# Patient Record
Sex: FEMALE | Race: WHITE | NOT HISPANIC OR LATINO
[De-identification: names, ages, dates, MRNs, and addresses within clinical notes are randomized per-mention and may not be internally consistent; named-entity substitution may affect disease eponyms.]

---

## 2017-03-02 ENCOUNTER — APPOINTMENT (OUTPATIENT)
Dept: RADIOLOGY | Facility: IMAGING CENTER | Age: 72
End: 2017-03-02

## 2017-03-02 ENCOUNTER — OUTPATIENT (OUTPATIENT)
Dept: OUTPATIENT SERVICES | Facility: HOSPITAL | Age: 72
LOS: 1 days | End: 2017-03-02
Payer: MEDICARE

## 2017-03-02 DIAGNOSIS — Z00.8 ENCOUNTER FOR OTHER GENERAL EXAMINATION: ICD-10-CM

## 2017-03-02 PROCEDURE — 77080 DXA BONE DENSITY AXIAL: CPT

## 2017-03-06 ENCOUNTER — APPOINTMENT (OUTPATIENT)
Dept: NUCLEAR MEDICINE | Facility: HOSPITAL | Age: 72
End: 2017-03-06

## 2017-03-06 ENCOUNTER — OUTPATIENT (OUTPATIENT)
Dept: OUTPATIENT SERVICES | Facility: HOSPITAL | Age: 72
LOS: 1 days | End: 2017-03-06
Payer: MEDICARE

## 2017-03-06 DIAGNOSIS — E05.90 THYROTOXICOSIS, UNSPECIFIED WITHOUT THYROTOXIC CRISIS OR STORM: ICD-10-CM

## 2017-03-06 DIAGNOSIS — C73 MALIGNANT NEOPLASM OF THYROID GLAND: ICD-10-CM

## 2017-03-07 ENCOUNTER — APPOINTMENT (OUTPATIENT)
Dept: NUCLEAR MEDICINE | Facility: HOSPITAL | Age: 72
End: 2017-03-07

## 2017-03-07 PROCEDURE — 78014 THYROID IMAGING W/BLOOD FLOW: CPT

## 2017-03-07 PROCEDURE — A9516: CPT

## 2017-05-22 ENCOUNTER — APPOINTMENT (OUTPATIENT)
Dept: INFECTIOUS DISEASE | Facility: CLINIC | Age: 72
End: 2017-05-22

## 2017-05-22 DIAGNOSIS — Z71.89 OTHER SPECIFIED COUNSELING: ICD-10-CM

## 2017-05-22 RX ORDER — ATOVAQUONE AND PROGUANIL HYDROCHLORIDE 250; 100 MG/1; MG/1
250-100 TABLET, FILM COATED ORAL DAILY
Qty: 24 | Refills: 0 | Status: ACTIVE | COMMUNITY
Start: 2017-05-22 | End: 1900-01-01

## 2017-06-14 ENCOUNTER — APPOINTMENT (OUTPATIENT)
Dept: ULTRASOUND IMAGING | Facility: CLINIC | Age: 72
End: 2017-06-14

## 2017-06-14 ENCOUNTER — OUTPATIENT (OUTPATIENT)
Dept: OUTPATIENT SERVICES | Facility: HOSPITAL | Age: 72
LOS: 1 days | End: 2017-06-14
Payer: MEDICARE

## 2017-06-14 DIAGNOSIS — Z00.8 ENCOUNTER FOR OTHER GENERAL EXAMINATION: ICD-10-CM

## 2017-06-14 PROCEDURE — 76830 TRANSVAGINAL US NON-OB: CPT

## 2017-06-14 PROCEDURE — 76856 US EXAM PELVIC COMPLETE: CPT

## 2017-09-22 ENCOUNTER — APPOINTMENT (OUTPATIENT)
Dept: ULTRASOUND IMAGING | Facility: CLINIC | Age: 72
End: 2017-09-22
Payer: MEDICARE

## 2017-09-22 ENCOUNTER — OUTPATIENT (OUTPATIENT)
Dept: OUTPATIENT SERVICES | Facility: HOSPITAL | Age: 72
LOS: 1 days | End: 2017-09-22
Payer: MEDICARE

## 2017-09-22 DIAGNOSIS — Z00.8 ENCOUNTER FOR OTHER GENERAL EXAMINATION: ICD-10-CM

## 2017-09-22 PROCEDURE — 76856 US EXAM PELVIC COMPLETE: CPT | Mod: 26

## 2017-09-22 PROCEDURE — 76770 US EXAM ABDO BACK WALL COMP: CPT

## 2017-09-22 PROCEDURE — 76770 US EXAM ABDO BACK WALL COMP: CPT | Mod: 26

## 2017-09-22 PROCEDURE — 76856 US EXAM PELVIC COMPLETE: CPT

## 2017-09-26 DIAGNOSIS — D25.2 SUBSEROSAL LEIOMYOMA OF UTERUS: ICD-10-CM

## 2017-09-26 DIAGNOSIS — R30.0 DYSURIA: ICD-10-CM

## 2017-09-26 DIAGNOSIS — N20.0 CALCULUS OF KIDNEY: ICD-10-CM

## 2017-10-16 ENCOUNTER — APPOINTMENT (OUTPATIENT)
Dept: SURGICAL ONCOLOGY | Facility: CLINIC | Age: 72
End: 2017-10-16

## 2017-10-16 ENCOUNTER — RESULT REVIEW (OUTPATIENT)
Age: 72
End: 2017-10-16

## 2017-12-21 ENCOUNTER — OUTPATIENT (OUTPATIENT)
Dept: OUTPATIENT SERVICES | Facility: HOSPITAL | Age: 72
LOS: 1 days | End: 2017-12-21
Payer: MEDICARE

## 2017-12-21 ENCOUNTER — APPOINTMENT (OUTPATIENT)
Dept: ULTRASOUND IMAGING | Facility: IMAGING CENTER | Age: 72
End: 2017-12-21
Payer: MEDICARE

## 2017-12-21 ENCOUNTER — APPOINTMENT (OUTPATIENT)
Dept: MAMMOGRAPHY | Facility: IMAGING CENTER | Age: 72
End: 2017-12-21
Payer: MEDICARE

## 2017-12-21 DIAGNOSIS — Z12.31 ENCOUNTER FOR SCREENING MAMMOGRAM FOR MALIGNANT NEOPLASM OF BREAST: ICD-10-CM

## 2017-12-21 PROCEDURE — 76641 ULTRASOUND BREAST COMPLETE: CPT

## 2017-12-21 PROCEDURE — G0202: CPT | Mod: 26

## 2017-12-21 PROCEDURE — 76641 ULTRASOUND BREAST COMPLETE: CPT | Mod: 26,50

## 2017-12-21 PROCEDURE — 76641 ULTRASOUND BREAST COMPLETE: CPT | Mod: 26,LT

## 2017-12-21 PROCEDURE — 77063 BREAST TOMOSYNTHESIS BI: CPT | Mod: 26

## 2017-12-21 PROCEDURE — 77067 SCR MAMMO BI INCL CAD: CPT

## 2017-12-21 PROCEDURE — 77063 BREAST TOMOSYNTHESIS BI: CPT

## 2018-04-10 ENCOUNTER — APPOINTMENT (OUTPATIENT)
Dept: UROGYNECOLOGY | Facility: CLINIC | Age: 73
End: 2018-04-10
Payer: MEDICARE

## 2018-04-10 VITALS
HEIGHT: 60 IN | SYSTOLIC BLOOD PRESSURE: 171 MMHG | WEIGHT: 107 LBS | BODY MASS INDEX: 21.01 KG/M2 | DIASTOLIC BLOOD PRESSURE: 82 MMHG | HEART RATE: 66 BPM

## 2018-04-10 LAB
BILIRUB UR QL STRIP: NORMAL
CLARITY UR: CLEAR
COLLECTION METHOD: NORMAL
GLUCOSE UR-MCNC: NORMAL
HCG UR QL: 0.2 EU/DL
HGB UR QL STRIP.AUTO: NORMAL
KETONES UR-MCNC: NORMAL
LEUKOCYTE ESTERASE UR QL STRIP: NORMAL
NITRITE UR QL STRIP: NORMAL
PH UR STRIP: 5.5
PROT UR STRIP-MCNC: NORMAL
SP GR UR STRIP: 1.01

## 2018-04-10 PROCEDURE — 81003 URINALYSIS AUTO W/O SCOPE: CPT | Mod: QW

## 2018-04-10 PROCEDURE — 51701 INSERT BLADDER CATHETER: CPT

## 2018-04-10 PROCEDURE — 99204 OFFICE O/P NEW MOD 45 MIN: CPT | Mod: 25

## 2018-04-10 RX ORDER — ESTRADIOL 0.1 MG/G
0.1 CREAM VAGINAL
Qty: 1 | Refills: 3 | Status: ACTIVE | COMMUNITY
Start: 2018-04-10 | End: 1900-01-01

## 2018-04-12 ENCOUNTER — APPOINTMENT (OUTPATIENT)
Dept: UROGYNECOLOGY | Facility: CLINIC | Age: 73
End: 2018-04-12
Payer: MEDICARE

## 2018-04-12 PROCEDURE — A4562: CPT

## 2018-04-12 PROCEDURE — 99214 OFFICE O/P EST MOD 30 MIN: CPT

## 2018-04-23 ENCOUNTER — APPOINTMENT (OUTPATIENT)
Dept: UROGYNECOLOGY | Facility: CLINIC | Age: 73
End: 2018-04-23
Payer: MEDICARE

## 2018-04-23 PROCEDURE — 99213 OFFICE O/P EST LOW 20 MIN: CPT

## 2018-09-10 ENCOUNTER — APPOINTMENT (OUTPATIENT)
Dept: SURGICAL ONCOLOGY | Facility: CLINIC | Age: 73
End: 2018-09-10

## 2018-10-02 ENCOUNTER — APPOINTMENT (OUTPATIENT)
Dept: UROGYNECOLOGY | Facility: CLINIC | Age: 73
End: 2018-10-02
Payer: MEDICARE

## 2018-10-02 PROCEDURE — 99213 OFFICE O/P EST LOW 20 MIN: CPT

## 2019-01-03 ENCOUNTER — APPOINTMENT (OUTPATIENT)
Dept: MAMMOGRAPHY | Facility: IMAGING CENTER | Age: 74
End: 2019-01-03
Payer: MEDICARE

## 2019-01-03 ENCOUNTER — OUTPATIENT (OUTPATIENT)
Dept: OUTPATIENT SERVICES | Facility: HOSPITAL | Age: 74
LOS: 1 days | End: 2019-01-03
Payer: MEDICARE

## 2019-01-03 ENCOUNTER — APPOINTMENT (OUTPATIENT)
Dept: ULTRASOUND IMAGING | Facility: IMAGING CENTER | Age: 74
End: 2019-01-03
Payer: MEDICARE

## 2019-01-03 DIAGNOSIS — Z12.31 ENCOUNTER FOR SCREENING MAMMOGRAM FOR MALIGNANT NEOPLASM OF BREAST: ICD-10-CM

## 2019-01-03 DIAGNOSIS — R92.2 INCONCLUSIVE MAMMOGRAM: ICD-10-CM

## 2019-01-03 PROCEDURE — 77063 BREAST TOMOSYNTHESIS BI: CPT

## 2019-01-03 PROCEDURE — 76641 ULTRASOUND BREAST COMPLETE: CPT | Mod: 26,50

## 2019-01-03 PROCEDURE — 77063 BREAST TOMOSYNTHESIS BI: CPT | Mod: 26

## 2019-01-03 PROCEDURE — 77067 SCR MAMMO BI INCL CAD: CPT | Mod: 26

## 2019-01-03 PROCEDURE — 76641 ULTRASOUND BREAST COMPLETE: CPT

## 2019-01-03 PROCEDURE — 77067 SCR MAMMO BI INCL CAD: CPT

## 2019-03-04 ENCOUNTER — APPOINTMENT (OUTPATIENT)
Dept: RADIOLOGY | Facility: IMAGING CENTER | Age: 74
End: 2019-03-04

## 2019-07-24 ENCOUNTER — OUTPATIENT (OUTPATIENT)
Dept: OUTPATIENT SERVICES | Facility: HOSPITAL | Age: 74
LOS: 1 days | End: 2019-07-24
Payer: MEDICARE

## 2019-07-24 ENCOUNTER — APPOINTMENT (OUTPATIENT)
Dept: RADIOLOGY | Facility: IMAGING CENTER | Age: 74
End: 2019-07-24
Payer: MEDICARE

## 2019-07-24 DIAGNOSIS — Z00.00 ENCOUNTER FOR GENERAL ADULT MEDICAL EXAMINATION WITHOUT ABNORMAL FINDINGS: ICD-10-CM

## 2019-07-24 PROCEDURE — 77080 DXA BONE DENSITY AXIAL: CPT

## 2019-07-24 PROCEDURE — 77080 DXA BONE DENSITY AXIAL: CPT | Mod: 26

## 2019-09-06 ENCOUNTER — OUTPATIENT (OUTPATIENT)
Dept: OUTPATIENT SERVICES | Facility: HOSPITAL | Age: 74
LOS: 1 days | End: 2019-09-06
Payer: MEDICARE

## 2019-09-06 ENCOUNTER — APPOINTMENT (OUTPATIENT)
Dept: MRI IMAGING | Facility: CLINIC | Age: 74
End: 2019-09-06
Payer: MEDICARE

## 2019-09-06 DIAGNOSIS — Z12.31 ENCOUNTER FOR SCREENING MAMMOGRAM FOR MALIGNANT NEOPLASM OF BREAST: ICD-10-CM

## 2019-09-06 PROCEDURE — 70553 MRI BRAIN STEM W/O & W/DYE: CPT | Mod: 26

## 2019-09-06 PROCEDURE — A9585: CPT

## 2019-09-06 PROCEDURE — 70553 MRI BRAIN STEM W/O & W/DYE: CPT

## 2019-10-31 ENCOUNTER — APPOINTMENT (OUTPATIENT)
Dept: ULTRASOUND IMAGING | Facility: CLINIC | Age: 74
End: 2019-10-31
Payer: MEDICARE

## 2019-10-31 ENCOUNTER — OUTPATIENT (OUTPATIENT)
Dept: OUTPATIENT SERVICES | Facility: HOSPITAL | Age: 74
LOS: 1 days | End: 2019-10-31
Payer: MEDICARE

## 2019-10-31 DIAGNOSIS — R35.0 FREQUENCY OF MICTURITION: ICD-10-CM

## 2019-10-31 PROCEDURE — 76830 TRANSVAGINAL US NON-OB: CPT

## 2019-10-31 PROCEDURE — 76856 US EXAM PELVIC COMPLETE: CPT | Mod: 26

## 2019-10-31 PROCEDURE — 76830 TRANSVAGINAL US NON-OB: CPT | Mod: 26

## 2019-10-31 PROCEDURE — 76856 US EXAM PELVIC COMPLETE: CPT

## 2019-11-18 ENCOUNTER — OUTPATIENT (OUTPATIENT)
Dept: OUTPATIENT SERVICES | Facility: HOSPITAL | Age: 74
LOS: 1 days | End: 2019-11-18
Payer: MEDICARE

## 2019-11-18 ENCOUNTER — APPOINTMENT (OUTPATIENT)
Dept: ULTRASOUND IMAGING | Facility: CLINIC | Age: 74
End: 2019-11-18

## 2019-11-18 DIAGNOSIS — Z00.8 ENCOUNTER FOR OTHER GENERAL EXAMINATION: ICD-10-CM

## 2019-11-18 PROCEDURE — 76831 ECHO EXAM UTERUS: CPT

## 2019-11-18 PROCEDURE — 58340 CATHETER FOR HYSTEROGRAPHY: CPT

## 2019-11-18 PROCEDURE — 76831 ECHO EXAM UTERUS: CPT | Mod: 26

## 2020-01-03 ENCOUNTER — RESULT REVIEW (OUTPATIENT)
Age: 75
End: 2020-01-03

## 2020-01-07 ENCOUNTER — APPOINTMENT (OUTPATIENT)
Dept: ULTRASOUND IMAGING | Facility: CLINIC | Age: 75
End: 2020-01-07
Payer: MEDICARE

## 2020-01-07 ENCOUNTER — APPOINTMENT (OUTPATIENT)
Dept: MAMMOGRAPHY | Facility: CLINIC | Age: 75
End: 2020-01-07
Payer: MEDICARE

## 2020-01-07 ENCOUNTER — OUTPATIENT (OUTPATIENT)
Dept: OUTPATIENT SERVICES | Facility: HOSPITAL | Age: 75
LOS: 1 days | End: 2020-01-07
Payer: MEDICARE

## 2020-01-07 DIAGNOSIS — Z00.8 ENCOUNTER FOR OTHER GENERAL EXAMINATION: ICD-10-CM

## 2020-01-07 PROCEDURE — 76641 ULTRASOUND BREAST COMPLETE: CPT | Mod: 26,50

## 2020-01-07 PROCEDURE — 77063 BREAST TOMOSYNTHESIS BI: CPT

## 2020-01-07 PROCEDURE — 77063 BREAST TOMOSYNTHESIS BI: CPT | Mod: 26

## 2020-01-07 PROCEDURE — 77067 SCR MAMMO BI INCL CAD: CPT

## 2020-01-07 PROCEDURE — 77067 SCR MAMMO BI INCL CAD: CPT | Mod: 26

## 2020-01-07 PROCEDURE — 76641 ULTRASOUND BREAST COMPLETE: CPT

## 2020-10-20 ENCOUNTER — APPOINTMENT (OUTPATIENT)
Dept: UROGYNECOLOGY | Facility: CLINIC | Age: 75
End: 2020-10-20
Payer: MEDICARE

## 2020-10-20 VITALS
TEMPERATURE: 97.5 F | BODY MASS INDEX: 57.52 KG/M2 | SYSTOLIC BLOOD PRESSURE: 142 MMHG | WEIGHT: 293 LBS | DIASTOLIC BLOOD PRESSURE: 86 MMHG | HEIGHT: 60 IN

## 2020-10-20 DIAGNOSIS — N81.10 CYSTOCELE, UNSPECIFIED: ICD-10-CM

## 2020-10-20 PROCEDURE — 99214 OFFICE O/P EST MOD 30 MIN: CPT

## 2020-10-20 PROCEDURE — A4562: CPT

## 2020-10-20 NOTE — PHYSICAL EXAM
[Chaperone Present] : A chaperone was present in the examining room during all aspects of the physical examination [Labia Majora] : were normal [Vulvar Atrophy] : vulvar atrophy [Atrophy] : atrophy [Normal Appearance] : general appearance was normal [Exam Deferred] : was deferred [No Bleeding] : there was no active vaginal bleeding [Aa ____] : Aa [unfilled] [Ba ____] : Ba [unfilled] [C ____] : C [unfilled] [GH ____] : GH [unfilled] [PB ____] : PB [unfilled] [TVL ____] : TVL  [unfilled] [Ap ____] : Ap [unfilled] [Bp ____] : Bp [unfilled] [D ____] : D [unfilled] [Normal] : normal [FreeTextEntry1] : General: Well, appearing. Alert and orientated. No acute distress\par HEENT: Normocephalic, atraumatic and extraocular muscles appear to be intact \par Neck: Full range of motion, no obvious lymphadenopathy, deformities, or masses noted \par Respiratory: Speaking in full sentences comfortably, normal work of breathing and no cough during visit\par Musculoskeletal: active full range of motion in extremities \par Extremities: No upper extremity edema noted\par Skin: no obvious rash or skin lesions\par Neuro: Orientated X 3, speech is fluent, normal rate\par Psych: Normal mood and affect \par   [Tenderness] : ~T no ~M abdominal tenderness observed [Distended] : not distended

## 2020-10-20 NOTE — DISCUSSION/SUMMARY
[FreeTextEntry1] : \par 1. Uterovaginal prolapse, midline cystocele:\par -Refitted with RS#3\par - Pessary precautions and instructions reviewed\par \par 2. Atrophy:\par -Increase Estrace to 1 g biw. Instructions for use reviewed. She denies side effects with use\par \par RTO in 3 mo for follow up, or sooner if issues arise. \par \par The following was provided to her in written form and reviewed extensively. Patient was given a copy to take home: \par Start low dose vaginal estrogen cream. It comes with an applicator that is inserted into the vagina at bedtime. Fill up the applicator with cream up to the 1 gram line. While lying in bed, gently insert the pre-filled applicator with the cream into the vagina ~2 inches inside the vagina. Hit the button to release the cream into the vagina. Let the cream stay inside the vagina overnight where it will absorb. \par \par Directions: Use twice weekly at bedtime (ex. Mon/Thursday)\par \par When inserted pessary, use Astroglide or any water-based vaginal lubricant. Recommend removing the pessary once weekly at bedtime, reinsert in the morning\par

## 2020-10-20 NOTE — PROCEDURE
[Good Fit] : fits well [Pessary Inserted] : inserted [None] : no bleeding [Refit] : refit is not needed [Erosion] : no evidence of erosion [Erythema] : no erythema [Discharge] : no vaginal discharge [Infection] : no evidence of infection [FreeTextEntry1] : ring with support #3

## 2020-10-20 NOTE — HISTORY OF PRESENT ILLNESS
[Rectal Prolapse] : no [Unable To Restrain Bowel Movement] : no [Urinary Tract Infection] : no [Urinary Frequency] : no [Constipation Obstructed Defecation] : no [Rectal Pain] : mild [] : years ago [FreeTextEntry5] : underwent treatment with ring pessary #2, removed January 2020. Lost pessary [de-identified] : irritation from dryness. Uses estrace however only uses a pea-sized amount twice weekly [FreeTextEntry1] : \par Janae presents for follow up of her pelvic organ prolapse. She is transferring her care from Dr Vaca. She has undergone treatment in past with a ring pessary, inserted 2018. She was performing self-care until January 2020 when she removed it and then subsequently lost the pessary. She reports bothersome bulge symptoms today and desires evaluation and insertion of new pessary.  She reports ring #2 felt as though it wasn’t holding up her prolapse as adequately as it did in 2018.

## 2021-01-12 ENCOUNTER — APPOINTMENT (OUTPATIENT)
Dept: UROGYNECOLOGY | Facility: CLINIC | Age: 76
End: 2021-01-12

## 2021-03-15 ENCOUNTER — APPOINTMENT (OUTPATIENT)
Dept: ULTRASOUND IMAGING | Facility: CLINIC | Age: 76
End: 2021-03-15
Payer: MEDICARE

## 2021-03-15 ENCOUNTER — OUTPATIENT (OUTPATIENT)
Dept: OUTPATIENT SERVICES | Facility: HOSPITAL | Age: 76
LOS: 1 days | End: 2021-03-15
Payer: MEDICARE

## 2021-03-15 DIAGNOSIS — Z00.8 ENCOUNTER FOR OTHER GENERAL EXAMINATION: ICD-10-CM

## 2021-03-15 DIAGNOSIS — N81.89 OTHER FEMALE GENITAL PROLAPSE: ICD-10-CM

## 2021-03-15 PROCEDURE — 76856 US EXAM PELVIC COMPLETE: CPT | Mod: 26

## 2021-03-15 PROCEDURE — 76856 US EXAM PELVIC COMPLETE: CPT

## 2021-03-18 DIAGNOSIS — Z01.818 ENCOUNTER FOR OTHER PREPROCEDURAL EXAMINATION: ICD-10-CM

## 2021-03-20 ENCOUNTER — APPOINTMENT (OUTPATIENT)
Dept: DISASTER EMERGENCY | Facility: CLINIC | Age: 76
End: 2021-03-20

## 2021-03-21 LAB — SARS-COV-2 N GENE NPH QL NAA+PROBE: NOT DETECTED

## 2021-03-23 ENCOUNTER — RESULT REVIEW (OUTPATIENT)
Age: 76
End: 2021-03-23

## 2021-03-23 ENCOUNTER — OUTPATIENT (OUTPATIENT)
Dept: OUTPATIENT SERVICES | Facility: HOSPITAL | Age: 76
LOS: 1 days | End: 2021-03-23
Payer: MEDICARE

## 2021-03-23 VITALS
TEMPERATURE: 99 F | DIASTOLIC BLOOD PRESSURE: 66 MMHG | RESPIRATION RATE: 17 BRPM | SYSTOLIC BLOOD PRESSURE: 131 MMHG | HEIGHT: 65 IN | WEIGHT: 106.04 LBS | HEART RATE: 63 BPM

## 2021-03-23 VITALS
HEART RATE: 58 BPM | DIASTOLIC BLOOD PRESSURE: 59 MMHG | SYSTOLIC BLOOD PRESSURE: 122 MMHG | RESPIRATION RATE: 16 BRPM | OXYGEN SATURATION: 98 %

## 2021-03-23 DIAGNOSIS — S62.102D FRACTURE OF UNSPECIFIED CARPAL BONE, LEFT WRIST, SUBSEQUENT ENCOUNTER FOR FRACTURE WITH ROUTINE HEALING: Chronic | ICD-10-CM

## 2021-03-23 DIAGNOSIS — Z90.89 ACQUIRED ABSENCE OF OTHER ORGANS: Chronic | ICD-10-CM

## 2021-03-23 DIAGNOSIS — M23.8X1 OTHER INTERNAL DERANGEMENTS OF RIGHT KNEE: Chronic | ICD-10-CM

## 2021-03-23 DIAGNOSIS — Z90.49 ACQUIRED ABSENCE OF OTHER SPECIFIED PARTS OF DIGESTIVE TRACT: Chronic | ICD-10-CM

## 2021-03-23 DIAGNOSIS — K21.9 GASTRO-ESOPHAGEAL REFLUX DISEASE WITHOUT ESOPHAGITIS: ICD-10-CM

## 2021-03-23 PROCEDURE — 88305 TISSUE EXAM BY PATHOLOGIST: CPT

## 2021-03-23 PROCEDURE — 88305 TISSUE EXAM BY PATHOLOGIST: CPT | Mod: 26

## 2021-03-23 PROCEDURE — 43239 EGD BIOPSY SINGLE/MULTIPLE: CPT

## 2021-03-23 RX ORDER — SODIUM CHLORIDE 9 MG/ML
500 INJECTION INTRAMUSCULAR; INTRAVENOUS; SUBCUTANEOUS
Refills: 0 | Status: DISCONTINUED | OUTPATIENT
Start: 2021-03-23 | End: 2021-04-06

## 2021-03-23 NOTE — ASU DISCHARGE PLAN (ADULT/PEDIATRIC) - CARE PROVIDER_API CALL
Karl Alcantara  GASTROENTEROLOGY  [address], [address]  [place], [place] [id number]  Phone: [phone]  Fax: [phone]  Established Patient  Follow Up Time:

## 2021-03-23 NOTE — PRE PROCEDURE NOTE - ATTENDING COMMENTS
Karl Alcantara MD, FACP, FACG, AGAF  [facility]  [phone]     After hours and weekend coverage GI service : [phone]

## 2021-03-23 NOTE — ASU DISCHARGE PLAN (ADULT/PEDIATRIC) - CALL YOUR DOCTOR IF YOU HAVE ANY OF THE FOLLOWING:
Bleeding that does not stop/Pain not relieved by Medications/Fever greater than (need to indicate Fahrenheit or Celsius)/Numbness, tingling, color or temperature change to extremity/Nausea and vomiting that does not stop/Unable to urinate/Excessive diarrhea/Inability to tolerate liquids or foods/Increased irritability or sluggishness

## 2021-03-23 NOTE — PRE PROCEDURE NOTE - PRE PROCEDURE EVALUATION
Pre-Endoscopy Evaluation      Referring Physician:  Karl Alcantara M.D.                          Procedure:  EGD    Indication for Procedure:  GERD    Pertinent History:      Sedation by Anesthesia [x]    PAST MEDICAL & SURGICAL HISTORY:  History of tonsillectomy    History of appendectomy    Derangement of right knee ligament  ACL tear    Closed fracture of left wrist with routine healing, subsequent encounter        PMH of Gastroparesis [ ]  Gastric Surgery [ ]  Gastric Outlet Obstruction [ ]  None [x]    Allergies    sulfa drugs (Rash)    Intolerances        Latex allergy: [ ] yes [ ] no    Medications:MEDICATIONS  (STANDING):  sodium chloride 0.9%. 500 milliLiter(s) (30 mL/Hr) IV Continuous <Continuous>    Smoking: [ ] yes  [x] no    AICD/PPM: [ ] yes   [x] no    Pertinent lab data:                        Physical Examination:  Daily Height in cm: 165.1 (23 Mar 2021 08:48)    Daily   Vital Signs Last 24 Hrs  T(C): 37.2 (23 Mar 2021 08:48), Max: 37.2 (23 Mar 2021 08:48)  T(F): 99 (23 Mar 2021 08:48), Max: 99 (23 Mar 2021 08:48)  HR: 63 (23 Mar 2021 08:48) (63 - 63)  BP: 131/66 (23 Mar 2021 08:48) (131/66 - 131/66)  BP(mean): --  RR: 17 (23 Mar 2021 08:48) (17 - 17)  SpO2: --      Constitutional: NAD    HEENT: PERRLA, EOMI,       Neck:  No JVD    Respiratory: CTAB/L    Cardiovascular: S1 and S2    Gastrointestinal: BS+, soft, NT/ND    Extremities: No peripheral edema    Neurological: A/O x 3, no focal deficits    Psychiatric: Normal mood, normal affect    : No Johnson    Skin: No rashes    Comments:    ASA Class: I [ ]  II [x]  III [ ]  IV [ ]

## 2021-03-24 LAB — SURGICAL PATHOLOGY STUDY: SIGNIFICANT CHANGE UP

## 2021-03-31 ENCOUNTER — APPOINTMENT (OUTPATIENT)
Dept: MAMMOGRAPHY | Facility: CLINIC | Age: 76
End: 2021-03-31
Payer: MEDICARE

## 2021-03-31 ENCOUNTER — APPOINTMENT (OUTPATIENT)
Dept: ULTRASOUND IMAGING | Facility: CLINIC | Age: 76
End: 2021-03-31
Payer: MEDICARE

## 2021-03-31 ENCOUNTER — OUTPATIENT (OUTPATIENT)
Dept: OUTPATIENT SERVICES | Facility: HOSPITAL | Age: 76
LOS: 1 days | End: 2021-03-31
Payer: MEDICARE

## 2021-03-31 DIAGNOSIS — S62.102D FRACTURE OF UNSPECIFIED CARPAL BONE, LEFT WRIST, SUBSEQUENT ENCOUNTER FOR FRACTURE WITH ROUTINE HEALING: Chronic | ICD-10-CM

## 2021-03-31 DIAGNOSIS — Z90.89 ACQUIRED ABSENCE OF OTHER ORGANS: Chronic | ICD-10-CM

## 2021-03-31 DIAGNOSIS — M23.8X1 OTHER INTERNAL DERANGEMENTS OF RIGHT KNEE: Chronic | ICD-10-CM

## 2021-03-31 DIAGNOSIS — Z00.8 ENCOUNTER FOR OTHER GENERAL EXAMINATION: ICD-10-CM

## 2021-03-31 DIAGNOSIS — Z90.49 ACQUIRED ABSENCE OF OTHER SPECIFIED PARTS OF DIGESTIVE TRACT: Chronic | ICD-10-CM

## 2021-03-31 PROCEDURE — 76641 ULTRASOUND BREAST COMPLETE: CPT | Mod: 26,50

## 2021-03-31 PROCEDURE — 77063 BREAST TOMOSYNTHESIS BI: CPT | Mod: 26

## 2021-03-31 PROCEDURE — 76641 ULTRASOUND BREAST COMPLETE: CPT

## 2021-03-31 PROCEDURE — 77063 BREAST TOMOSYNTHESIS BI: CPT

## 2021-03-31 PROCEDURE — 77067 SCR MAMMO BI INCL CAD: CPT | Mod: 26

## 2021-03-31 PROCEDURE — 77067 SCR MAMMO BI INCL CAD: CPT

## 2021-04-02 ENCOUNTER — NON-APPOINTMENT (OUTPATIENT)
Age: 76
End: 2021-04-02

## 2021-04-15 ENCOUNTER — APPOINTMENT (OUTPATIENT)
Dept: SURGERY | Facility: CLINIC | Age: 76
End: 2021-04-15
Payer: MEDICARE

## 2021-04-15 ENCOUNTER — NON-APPOINTMENT (OUTPATIENT)
Age: 76
End: 2021-04-15

## 2021-04-15 VITALS
DIASTOLIC BLOOD PRESSURE: 84 MMHG | SYSTOLIC BLOOD PRESSURE: 170 MMHG | BODY MASS INDEX: 20.81 KG/M2 | WEIGHT: 106 LBS | HEART RATE: 65 BPM | HEIGHT: 60 IN

## 2021-04-15 DIAGNOSIS — M81.0 AGE-RELATED OSTEOPOROSIS W/OUT CURRENT PATHOLOGICAL FRACTURE: ICD-10-CM

## 2021-04-15 PROCEDURE — 36415 COLL VENOUS BLD VENIPUNCTURE: CPT

## 2021-04-15 PROCEDURE — 99204 OFFICE O/P NEW MOD 45 MIN: CPT | Mod: 25

## 2021-04-16 LAB
24R-OH-CALCIDIOL SERPL-MCNC: 114 PG/ML
CALCIUM SERPL-MCNC: 9.6 MG/DL
CALCIUM SERPL-MCNC: 9.6 MG/DL
PARATHYROID HORMONE INTACT: 82 PG/ML

## 2021-04-16 RX ORDER — KETOCONAZOLE 20 MG/G
2 CREAM TOPICAL
Qty: 30 | Refills: 0 | Status: ACTIVE | COMMUNITY
Start: 2021-04-06

## 2021-04-16 RX ORDER — HYDROCORTISONE 25 MG/G
2.5 CREAM TOPICAL
Qty: 1 | Refills: 0 | Status: ACTIVE | COMMUNITY
Start: 2021-04-06

## 2021-04-16 RX ORDER — FAMOTIDINE 20 MG/1
20 TABLET, FILM COATED ORAL
Qty: 30 | Refills: 0 | Status: ACTIVE | COMMUNITY
Start: 2021-03-11

## 2021-04-16 RX ORDER — PANTOPRAZOLE 40 MG/1
40 TABLET, DELAYED RELEASE ORAL
Qty: 30 | Refills: 0 | Status: ACTIVE | COMMUNITY
Start: 2021-03-23

## 2021-04-16 NOTE — PHYSICAL EXAM
[de-identified] : 4 cm right thyroid nodule, well circumscribed and mobile [Laryngoscopy Performed] : laryngoscopy was performed, see procedure section for findings [Midline] : located in midline position [Normal] : orientation to person, place, and time: normal [de-identified] : indirect  laryngoscopy shows normal vocal cord mobility bilaterally with no lesions noted

## 2021-04-16 NOTE — ASSESSMENT
[FreeTextEntry1] : lengthy discussion regarding options for management including active surveillance  vs thyroid lobectomy with possible paratracheal node dissection, with or without frozen section vs total thyroidectomy with possible paratracheal node dissection. risks, benefits and alternatives discussed at length.  I have discussed with the patient the anatomy of the area, the pathophysiology of the disease process and the rationale for surgery.  The attendant risks, possible complications and expected postoperative course have been discussed in detail.  I have given the patient the opportunity to ask questions, and all questions have been answered to the patient's satisfaction.  recommended total thyroidectomy, particular if require parathyroidectomy at same time.  bloods drawn.  4D CT requested. surgery to be scheduled at [facility].  to call next week for results. \par

## 2021-04-16 NOTE — CONSULT LETTER
[Dear  ___] : Dear  [unfilled], [Consult Letter:] : I had the pleasure of evaluating your patient, [unfilled]. [Please see my note below.] : Please see my note below. [Consult Closing:] : Thank you very much for allowing me to participate in the care of this patient.  If you have any questions, please do not hesitate to contact me. [Sincerely,] : Sincerely, [FreeTextEntry2] : Dr. Galina Vazquez, Dr. Robb Avelar [FreeTextEntry3] : Quirino Varner MD, FACS\par System Director, Endocrine Surgery\par [facility]\par Assistant Clinical Professor of Surgery\par [employer] [facility]  [DrHerman  ___] : Dr. GALARZA

## 2021-04-16 NOTE — HISTORY OF PRESENT ILLNESS
[de-identified] : Pt c/o Thyroid nodule and  hoarseness.  denies dysphagia, SOB,bone pain, constipation or fatigue, hoarseness or RT exposure. \par TFTs normal\par Ca 9.8,  PTH 69,  Vitamin  D 79\par sonogram Right 4.2 cm,  left subcentimeter nodules\par bone density: osteoporosis\par FNA (CBL) FLUS,  TSHR positive\par I have reviewed all old and new data and available images.  Additional information was obtained from others present at the time of visit to ensure the completeness of the history\par \par

## 2021-04-16 NOTE — REASON FOR VISIT
[Initial Consultation] : an initial consultation for [Spouse] : spouse [FreeTextEntry2] : Thyroid nodule [Other: _____] : [unfilled]

## 2021-04-19 ENCOUNTER — RESULT REVIEW (OUTPATIENT)
Age: 76
End: 2021-04-19

## 2021-04-20 ENCOUNTER — OUTPATIENT (OUTPATIENT)
Dept: OUTPATIENT SERVICES | Facility: HOSPITAL | Age: 76
LOS: 1 days | End: 2021-04-20
Payer: MEDICARE

## 2021-04-20 ENCOUNTER — APPOINTMENT (OUTPATIENT)
Dept: CT IMAGING | Facility: CLINIC | Age: 76
End: 2021-04-20
Payer: MEDICARE

## 2021-04-20 DIAGNOSIS — Z90.89 ACQUIRED ABSENCE OF OTHER ORGANS: Chronic | ICD-10-CM

## 2021-04-20 DIAGNOSIS — M23.8X1 OTHER INTERNAL DERANGEMENTS OF RIGHT KNEE: Chronic | ICD-10-CM

## 2021-04-20 DIAGNOSIS — E21.3 HYPERPARATHYROIDISM, UNSPECIFIED: ICD-10-CM

## 2021-04-20 DIAGNOSIS — Z90.49 ACQUIRED ABSENCE OF OTHER SPECIFIED PARTS OF DIGESTIVE TRACT: Chronic | ICD-10-CM

## 2021-04-20 DIAGNOSIS — S62.102D FRACTURE OF UNSPECIFIED CARPAL BONE, LEFT WRIST, SUBSEQUENT ENCOUNTER FOR FRACTURE WITH ROUTINE HEALING: Chronic | ICD-10-CM

## 2021-04-20 PROCEDURE — 70491 CT SOFT TISSUE NECK W/DYE: CPT | Mod: 26,ME

## 2021-04-20 PROCEDURE — 70492 CT SFT TSUE NCK W/O & W/DYE: CPT

## 2021-04-20 PROCEDURE — G1004: CPT

## 2021-04-21 ENCOUNTER — TRANSCRIPTION ENCOUNTER (OUTPATIENT)
Age: 76
End: 2021-04-21

## 2021-04-22 ENCOUNTER — NON-APPOINTMENT (OUTPATIENT)
Age: 76
End: 2021-04-22

## 2021-04-22 DIAGNOSIS — R91.1 SOLITARY PULMONARY NODULE: ICD-10-CM

## 2021-04-23 ENCOUNTER — OUTPATIENT (OUTPATIENT)
Dept: OUTPATIENT SERVICES | Facility: HOSPITAL | Age: 76
LOS: 1 days | End: 2021-04-23
Payer: MEDICARE

## 2021-04-23 ENCOUNTER — APPOINTMENT (OUTPATIENT)
Dept: CT IMAGING | Facility: CLINIC | Age: 76
End: 2021-04-23
Payer: MEDICARE

## 2021-04-23 DIAGNOSIS — S62.102D FRACTURE OF UNSPECIFIED CARPAL BONE, LEFT WRIST, SUBSEQUENT ENCOUNTER FOR FRACTURE WITH ROUTINE HEALING: Chronic | ICD-10-CM

## 2021-04-23 DIAGNOSIS — Z90.49 ACQUIRED ABSENCE OF OTHER SPECIFIED PARTS OF DIGESTIVE TRACT: Chronic | ICD-10-CM

## 2021-04-23 DIAGNOSIS — M23.8X1 OTHER INTERNAL DERANGEMENTS OF RIGHT KNEE: Chronic | ICD-10-CM

## 2021-04-23 DIAGNOSIS — R91.1 SOLITARY PULMONARY NODULE: ICD-10-CM

## 2021-04-23 DIAGNOSIS — Z90.89 ACQUIRED ABSENCE OF OTHER ORGANS: Chronic | ICD-10-CM

## 2021-04-23 PROCEDURE — 71250 CT THORAX DX C-: CPT | Mod: 26,ME

## 2021-04-23 PROCEDURE — G1004: CPT

## 2021-04-23 PROCEDURE — 71250 CT THORAX DX C-: CPT

## 2021-04-27 ENCOUNTER — NON-APPOINTMENT (OUTPATIENT)
Age: 76
End: 2021-04-27

## 2021-05-11 ENCOUNTER — OUTPATIENT (OUTPATIENT)
Dept: OUTPATIENT SERVICES | Facility: HOSPITAL | Age: 76
LOS: 1 days | End: 2021-05-11
Payer: MEDICARE

## 2021-05-11 VITALS
SYSTOLIC BLOOD PRESSURE: 130 MMHG | RESPIRATION RATE: 16 BRPM | HEIGHT: 59 IN | TEMPERATURE: 97 F | DIASTOLIC BLOOD PRESSURE: 80 MMHG | OXYGEN SATURATION: 98 % | WEIGHT: 104.94 LBS | HEART RATE: 80 BPM

## 2021-05-11 DIAGNOSIS — Z98.890 OTHER SPECIFIED POSTPROCEDURAL STATES: Chronic | ICD-10-CM

## 2021-05-11 DIAGNOSIS — E21.0 PRIMARY HYPERPARATHYROIDISM: ICD-10-CM

## 2021-05-11 DIAGNOSIS — Z90.49 ACQUIRED ABSENCE OF OTHER SPECIFIED PARTS OF DIGESTIVE TRACT: Chronic | ICD-10-CM

## 2021-05-11 DIAGNOSIS — S62.102D FRACTURE OF UNSPECIFIED CARPAL BONE, LEFT WRIST, SUBSEQUENT ENCOUNTER FOR FRACTURE WITH ROUTINE HEALING: Chronic | ICD-10-CM

## 2021-05-11 DIAGNOSIS — Z90.89 ACQUIRED ABSENCE OF OTHER ORGANS: Chronic | ICD-10-CM

## 2021-05-11 DIAGNOSIS — M23.8X1 OTHER INTERNAL DERANGEMENTS OF RIGHT KNEE: Chronic | ICD-10-CM

## 2021-05-11 DIAGNOSIS — E04.1 NONTOXIC SINGLE THYROID NODULE: ICD-10-CM

## 2021-05-11 LAB
ANION GAP SERPL CALC-SCNC: 11 MMOL/L — SIGNIFICANT CHANGE UP (ref 7–14)
BUN SERPL-MCNC: 16 MG/DL — SIGNIFICANT CHANGE UP (ref 7–23)
CALCIUM SERPL-MCNC: 9.6 MG/DL — SIGNIFICANT CHANGE UP (ref 8.4–10.5)
CHLORIDE SERPL-SCNC: 106 MMOL/L — SIGNIFICANT CHANGE UP (ref 98–107)
CO2 SERPL-SCNC: 24 MMOL/L — SIGNIFICANT CHANGE UP (ref 22–31)
CREAT SERPL-MCNC: 0.59 MG/DL — SIGNIFICANT CHANGE UP (ref 0.5–1.3)
GLUCOSE SERPL-MCNC: 75 MG/DL — SIGNIFICANT CHANGE UP (ref 70–99)
HCT VFR BLD CALC: 40.8 % — SIGNIFICANT CHANGE UP (ref 34.5–45)
HGB BLD-MCNC: 13.2 G/DL — SIGNIFICANT CHANGE UP (ref 11.5–15.5)
MCHC RBC-ENTMCNC: 29.3 PG — SIGNIFICANT CHANGE UP (ref 27–34)
MCHC RBC-ENTMCNC: 32.4 GM/DL — SIGNIFICANT CHANGE UP (ref 32–36)
MCV RBC AUTO: 90.5 FL — SIGNIFICANT CHANGE UP (ref 80–100)
NRBC # BLD: 0 /100 WBCS — SIGNIFICANT CHANGE UP
NRBC # FLD: 0 K/UL — SIGNIFICANT CHANGE UP
PLATELET # BLD AUTO: 224 K/UL — SIGNIFICANT CHANGE UP (ref 150–400)
POTASSIUM SERPL-MCNC: 4 MMOL/L — SIGNIFICANT CHANGE UP (ref 3.5–5.3)
POTASSIUM SERPL-SCNC: 4 MMOL/L — SIGNIFICANT CHANGE UP (ref 3.5–5.3)
RBC # BLD: 4.51 M/UL — SIGNIFICANT CHANGE UP (ref 3.8–5.2)
RBC # FLD: 13.1 % — SIGNIFICANT CHANGE UP (ref 10.3–14.5)
SODIUM SERPL-SCNC: 141 MMOL/L — SIGNIFICANT CHANGE UP (ref 135–145)
WBC # BLD: 8.99 K/UL — SIGNIFICANT CHANGE UP (ref 3.8–10.5)
WBC # FLD AUTO: 8.99 K/UL — SIGNIFICANT CHANGE UP (ref 3.8–10.5)

## 2021-05-11 PROCEDURE — 93010 ELECTROCARDIOGRAM REPORT: CPT

## 2021-05-11 NOTE — H&P PST ADULT - ACTIVITY
works out daily (stationary bike, elliptical and lift weights) works out daily (rides stationary bike, elliptical and lift weights)

## 2021-05-11 NOTE — H&P PST ADULT - NSICDXPROBLEM_GEN_ALL_CORE_FT
PROBLEM DIAGNOSES  Problem: Nontoxic single thyroid nodule  Assessment and Plan: preop for total thyroidectomy,possible paratracheal node dissection, parathyroidectomy with parathyroid hormone assay on 5/24/21  preop instructions given, pt verbalized understanding  GI prophylaxis and chlorhexidine wash provided  pt is scheduled for COVID testing preop  medical clearance requested by surgeon and PST- pt >65 yrs old

## 2021-05-11 NOTE — H&P PST ADULT - NSICDXPASTSURGICALHX_GEN_ALL_CORE_FT
PAST SURGICAL HISTORY:  Closed fracture of left wrist with routine healing, subsequent encounter     Derangement of right knee ligament ACL tear    History of appendectomy     History of tonsillectomy     S/P excision of fibroadenoma of breast     S/P thyroid biopsy

## 2021-05-11 NOTE — H&P PST ADULT - NSICDXPASTMEDICALHX_GEN_ALL_CORE_FT
PAST MEDICAL HISTORY:  Fibroadenoma of breast     H/O Graves' disease dx 4 yrs ago. treated with methimazole. off meds for 3 yrs    Osteoporosis     Primary hyperparathyroidism     Thyroid nodule

## 2021-05-11 NOTE — H&P PST ADULT - HISTORY OF PRESENT ILLNESS
74 y/o female with osteoporosis presents to PST preop for total thyroidectomy, possible paratracheal node dissection, parathyroidectomy with parathyroid hormone assay. pt reports h/o thyroid nodule for 30 years. pt says she was monitored over the years by US and thyroid biopsies. Pt says a recent biopsy result in march 2021 came back "nonspecific".  76 y/o female with osteoporosis presents to PST preop for total thyroidectomy, possible paratracheal node dissection, parathyroidectomy with parathyroid hormone assay. pt reports h/o thyroid nodule for 30 years. pt says she was monitored over the years by US and multiple thyroid biopsies. Pt says a recent thyroid biopsy result in march 2021 came back "nonspecific".

## 2021-05-11 NOTE — H&P PST ADULT - NSICDXFAMILYHX_GEN_ALL_CORE_FT
FAMILY HISTORY:  Father  Still living? Unknown  Family history of Graves' disease, Age at diagnosis: Age Unknown    Mother  Still living? Unknown  Family history of goiter, Age at diagnosis: Age Unknown  Family history of renal cancer, Age at diagnosis: Age Unknown  FH: lung cancer, Age at diagnosis: Age Unknown

## 2021-05-21 ENCOUNTER — APPOINTMENT (OUTPATIENT)
Dept: DISASTER EMERGENCY | Facility: CLINIC | Age: 76
End: 2021-05-21

## 2021-05-21 PROBLEM — E21.0 PRIMARY HYPERPARATHYROIDISM: Chronic | Status: ACTIVE | Noted: 2021-05-11

## 2021-05-21 PROBLEM — M81.0 AGE-RELATED OSTEOPOROSIS WITHOUT CURRENT PATHOLOGICAL FRACTURE: Chronic | Status: ACTIVE | Noted: 2021-05-11

## 2021-05-21 PROBLEM — E04.1 NONTOXIC SINGLE THYROID NODULE: Chronic | Status: ACTIVE | Noted: 2021-05-11

## 2021-05-21 PROBLEM — D24.9 BENIGN NEOPLASM OF UNSPECIFIED BREAST: Chronic | Status: ACTIVE | Noted: 2021-05-11

## 2021-05-21 PROBLEM — Z86.39 PERSONAL HISTORY OF OTHER ENDOCRINE, NUTRITIONAL AND METABOLIC DISEASE: Chronic | Status: ACTIVE | Noted: 2021-05-11

## 2021-05-21 NOTE — ASU PATIENT PROFILE, ADULT - PSH
Closed fracture of left wrist with routine healing, subsequent encounter    Derangement of right knee ligament  ACL tear  History of appendectomy    History of tonsillectomy    S/P excision of fibroadenoma of breast    S/P thyroid biopsy

## 2021-05-21 NOTE — ASU PATIENT PROFILE, ADULT - PRO INTERPRETER NEED 2
Dx:    Lumbar radiculopathy (M54.16)  Lumbar disc disease (M51.9)  Lumbar herniated disc (M51.26)  Neck pain on right side (M54.2)      Insurance (Authorized # of Visits):  [employer] PPO; 13 visits; Cert ends 6/6/4935       Authorizing Physician: Dr. Ingrid Camara MD 5/5  Great Toe Ext (L5): R 4/5, L 5/5  PF (S1): R 4+/5; L 5/5      Strength: (* denotes performed with pain)  UE/Scapular 6/5/2020 LE 6/5/2020   Shoulder Flex: R 5/5, L 5/5  Shoulder ABD (C5): R 5/5, L 5/5  Biceps (C6): R 5/5, L 5/5  Wrist ext (C6): R 5/5, are in working progress. Plan: Progress core/scapular stabilization exercises.      Date:  6/1/2020               TX#: 4/8 Date: 6/5/2020            TX#: 5/8 Date: 6/8/2020  Tx#: 6/13 Date: 6/11/2020  Tx#: 7/13 Date: 6/15/2020  Tx#: 8/13   There Ex  Pr lumbar paraspinals x 6 min  Prone: P/A lumbar and lower/mid thoracic mobs x 4 min Manual Therapy  Supine: MFR at Cervical UT/scalenes and suboccipital release; CT junction mob to improve rot x 6 min  Supine: cervical distraction and lower cervical sideglid English

## 2021-05-21 NOTE — ASU PATIENT PROFILE, ADULT - PMH
Fibroadenoma of breast    H/O Graves' disease  dx 4 yrs ago. treated with methimazole. off meds for 3 yrs  Osteoporosis    Primary hyperparathyroidism    Thyroid nodule

## 2021-05-22 LAB — SARS-COV-2 N GENE NPH QL NAA+PROBE: NOT DETECTED

## 2021-05-24 ENCOUNTER — APPOINTMENT (OUTPATIENT)
Dept: SURGERY | Facility: HOSPITAL | Age: 76
End: 2021-05-24

## 2021-05-24 ENCOUNTER — RESULT REVIEW (OUTPATIENT)
Age: 76
End: 2021-05-24

## 2021-05-24 ENCOUNTER — TRANSCRIPTION ENCOUNTER (OUTPATIENT)
Age: 76
End: 2021-05-24

## 2021-05-24 ENCOUNTER — INPATIENT (INPATIENT)
Facility: HOSPITAL | Age: 76
LOS: 0 days | Discharge: HOME | End: 2021-05-25
Attending: SPECIALIST | Admitting: SPECIALIST
Payer: MEDICARE

## 2021-05-24 VITALS
SYSTOLIC BLOOD PRESSURE: 148 MMHG | HEIGHT: 59 IN | TEMPERATURE: 99 F | OXYGEN SATURATION: 100 % | DIASTOLIC BLOOD PRESSURE: 61 MMHG | WEIGHT: 104.94 LBS | RESPIRATION RATE: 15 BRPM | HEART RATE: 61 BPM

## 2021-05-24 DIAGNOSIS — Z90.49 ACQUIRED ABSENCE OF OTHER SPECIFIED PARTS OF DIGESTIVE TRACT: Chronic | ICD-10-CM

## 2021-05-24 DIAGNOSIS — M23.8X1 OTHER INTERNAL DERANGEMENTS OF RIGHT KNEE: Chronic | ICD-10-CM

## 2021-05-24 DIAGNOSIS — Z98.890 OTHER SPECIFIED POSTPROCEDURAL STATES: Chronic | ICD-10-CM

## 2021-05-24 DIAGNOSIS — E21.0 PRIMARY HYPERPARATHYROIDISM: ICD-10-CM

## 2021-05-24 DIAGNOSIS — S62.102D FRACTURE OF UNSPECIFIED CARPAL BONE, LEFT WRIST, SUBSEQUENT ENCOUNTER FOR FRACTURE WITH ROUTINE HEALING: Chronic | ICD-10-CM

## 2021-05-24 DIAGNOSIS — Z90.89 ACQUIRED ABSENCE OF OTHER ORGANS: Chronic | ICD-10-CM

## 2021-05-24 LAB
CALCIUM SERPL-MCNC: 8.4 MG/DL — SIGNIFICANT CHANGE UP (ref 8.4–10.5)
CALCIUM SERPL-MCNC: 8.6 MG/DL — SIGNIFICANT CHANGE UP (ref 8.4–10.5)
CALCIUM SERPL-MCNC: 9.3 MG/DL — SIGNIFICANT CHANGE UP (ref 8.4–10.5)
PTH INTACT, INTRAOP SPECIMEN 3: SIGNIFICANT CHANGE UP
PTH INTACT, INTRAOP SPECIMEN 4: SIGNIFICANT CHANGE UP
PTH INTACT, INTRAOP TIMING 2: SIGNIFICANT CHANGE UP
PTH INTACT, INTRAOP TIMING 3: SIGNIFICANT CHANGE UP
PTH INTACT, INTRAOP TIMING 4: SIGNIFICANT CHANGE UP
PTH INTACT, INTRAOPERATIVE 2: 145 PG/ML — HIGH (ref 15–65)
PTH INTACT, INTRAOPERATIVE 3: 25 PG/ML — SIGNIFICANT CHANGE UP (ref 15–65)
PTH INTACT, INTRAOPERATIVE 4: 19 PG/ML — SIGNIFICANT CHANGE UP (ref 15–65)
PTH-INTACT IO % DIF SERPL: 95 PG/ML — HIGH (ref 15–65)

## 2021-05-24 PROCEDURE — 88341 IMHCHEM/IMCYTCHM EA ADD ANTB: CPT | Mod: 26

## 2021-05-24 PROCEDURE — 88305 TISSUE EXAM BY PATHOLOGIST: CPT | Mod: 26

## 2021-05-24 PROCEDURE — 88342 IMHCHEM/IMCYTCHM 1ST ANTB: CPT | Mod: 26

## 2021-05-24 PROCEDURE — 88307 TISSUE EXAM BY PATHOLOGIST: CPT | Mod: 26

## 2021-05-24 RX ORDER — ACETAMINOPHEN 500 MG
650 TABLET ORAL EVERY 6 HOURS
Refills: 0 | Status: DISCONTINUED | OUTPATIENT
Start: 2021-05-24 | End: 2021-05-24

## 2021-05-24 RX ORDER — CHOLECALCIFEROL (VITAMIN D3) 125 MCG
0 CAPSULE ORAL
Qty: 0 | Refills: 0 | DISCHARGE

## 2021-05-24 RX ORDER — FENTANYL CITRATE 50 UG/ML
25 INJECTION INTRAVENOUS
Refills: 0 | Status: DISCONTINUED | OUTPATIENT
Start: 2021-05-24 | End: 2021-05-24

## 2021-05-24 RX ORDER — SODIUM CHLORIDE 9 MG/ML
1000 INJECTION, SOLUTION INTRAVENOUS
Refills: 0 | Status: DISCONTINUED | OUTPATIENT
Start: 2021-05-24 | End: 2021-05-25

## 2021-05-24 RX ORDER — CALCIUM CARBONATE 500(1250)
1 TABLET ORAL
Qty: 0 | Refills: 0 | DISCHARGE
Start: 2021-05-24

## 2021-05-24 RX ORDER — ONDANSETRON 8 MG/1
4 TABLET, FILM COATED ORAL ONCE
Refills: 0 | Status: DISCONTINUED | OUTPATIENT
Start: 2021-05-24 | End: 2021-05-24

## 2021-05-24 RX ORDER — OXYCODONE HYDROCHLORIDE 5 MG/1
5 TABLET ORAL ONCE
Refills: 0 | Status: DISCONTINUED | OUTPATIENT
Start: 2021-05-24 | End: 2021-05-24

## 2021-05-24 RX ORDER — CALCIUM CARBONATE 500(1250)
1 TABLET ORAL EVERY 6 HOURS
Refills: 0 | Status: DISCONTINUED | OUTPATIENT
Start: 2021-05-24 | End: 2021-05-25

## 2021-05-24 RX ORDER — ACETAMINOPHEN 500 MG
2 TABLET ORAL
Qty: 0 | Refills: 0 | DISCHARGE

## 2021-05-24 RX ORDER — LEVOTHYROXINE SODIUM 125 MCG
1 TABLET ORAL
Qty: 0 | Refills: 0 | DISCHARGE
Start: 2021-05-24

## 2021-05-24 RX ORDER — BENZOCAINE AND MENTHOL 5; 1 G/100ML; G/100ML
1 LIQUID ORAL
Refills: 0 | Status: DISCONTINUED | OUTPATIENT
Start: 2021-05-24 | End: 2021-05-24

## 2021-05-24 RX ORDER — LEVOTHYROXINE SODIUM 125 MCG
88 TABLET ORAL DAILY
Refills: 0 | Status: DISCONTINUED | OUTPATIENT
Start: 2021-05-24 | End: 2021-05-24

## 2021-05-24 RX ORDER — ACETAMINOPHEN 500 MG
2 TABLET ORAL
Qty: 0 | Refills: 0 | DISCHARGE
Start: 2021-05-24

## 2021-05-24 RX ORDER — CALCITRIOL 0.5 UG/1
0.5 CAPSULE ORAL EVERY 12 HOURS
Refills: 0 | Status: DISCONTINUED | OUTPATIENT
Start: 2021-05-24 | End: 2021-05-25

## 2021-05-24 RX ORDER — OXYCODONE AND ACETAMINOPHEN 5; 325 MG/1; MG/1
1 TABLET ORAL EVERY 6 HOURS
Refills: 0 | Status: DISCONTINUED | OUTPATIENT
Start: 2021-05-24 | End: 2021-05-25

## 2021-05-24 RX ORDER — CALCIUM GLUCONATE 100 MG/ML
1 VIAL (ML) INTRAVENOUS ONCE
Refills: 0 | Status: COMPLETED | OUTPATIENT
Start: 2021-05-24 | End: 2021-05-24

## 2021-05-24 RX ORDER — PANTOPRAZOLE SODIUM 20 MG/1
1 TABLET, DELAYED RELEASE ORAL
Qty: 0 | Refills: 0 | DISCHARGE

## 2021-05-24 RX ORDER — OXYCODONE AND ACETAMINOPHEN 5; 325 MG/1; MG/1
1 TABLET ORAL EVERY 6 HOURS
Refills: 0 | Status: DISCONTINUED | OUTPATIENT
Start: 2021-05-24 | End: 2021-05-24

## 2021-05-24 RX ORDER — LEVOTHYROXINE SODIUM 125 MCG
1 TABLET ORAL
Qty: 30 | Refills: 2
Start: 2021-05-24 | End: 2021-08-21

## 2021-05-24 RX ORDER — ACETAMINOPHEN 500 MG
650 TABLET ORAL EVERY 6 HOURS
Refills: 0 | Status: DISCONTINUED | OUTPATIENT
Start: 2021-05-24 | End: 2021-05-25

## 2021-05-24 RX ORDER — SODIUM CHLORIDE 9 MG/ML
1000 INJECTION, SOLUTION INTRAVENOUS
Refills: 0 | Status: DISCONTINUED | OUTPATIENT
Start: 2021-05-24 | End: 2021-05-24

## 2021-05-24 RX ORDER — CALCIUM CARBONATE 500(1250)
1 TABLET ORAL EVERY 6 HOURS
Refills: 0 | Status: DISCONTINUED | OUTPATIENT
Start: 2021-05-24 | End: 2021-05-24

## 2021-05-24 RX ORDER — PREGABALIN 225 MG/1
2500 CAPSULE ORAL
Qty: 0 | Refills: 0 | DISCHARGE

## 2021-05-24 RX ADMIN — Medication 650 MILLIGRAM(S): at 19:07

## 2021-05-24 RX ADMIN — OXYCODONE HYDROCHLORIDE 5 MILLIGRAM(S): 5 TABLET ORAL at 10:58

## 2021-05-24 RX ADMIN — Medication 1 TABLET(S): at 18:35

## 2021-05-24 RX ADMIN — OXYCODONE HYDROCHLORIDE 5 MILLIGRAM(S): 5 TABLET ORAL at 10:30

## 2021-05-24 RX ADMIN — Medication 1 TABLET(S): at 23:56

## 2021-05-24 RX ADMIN — ONDANSETRON 4 MILLIGRAM(S): 8 TABLET, FILM COATED ORAL at 13:08

## 2021-05-24 RX ADMIN — BENZOCAINE AND MENTHOL 1 LOZENGE: 5; 1 LIQUID ORAL at 09:54

## 2021-05-24 RX ADMIN — Medication 1 TABLET(S): at 10:00

## 2021-05-24 RX ADMIN — SODIUM CHLORIDE 50 MILLILITER(S): 9 INJECTION, SOLUTION INTRAVENOUS at 18:35

## 2021-05-24 RX ADMIN — Medication 650 MILLIGRAM(S): at 17:35

## 2021-05-24 RX ADMIN — Medication 100 GRAM(S): at 09:53

## 2021-05-24 RX ADMIN — CALCITRIOL 0.5 MICROGRAM(S): 0.5 CAPSULE ORAL at 18:35

## 2021-05-24 RX ADMIN — SODIUM CHLORIDE 50 MILLILITER(S): 9 INJECTION, SOLUTION INTRAVENOUS at 10:05

## 2021-05-24 NOTE — BRIEF OPERATIVE NOTE - NSICDXBRIEFPREOP_GEN_ALL_CORE_FT
PRE-OP DIAGNOSIS:  Nontoxic thyroid nodule 24-May-2021 09:44:41  Red Astudillo  Primary hyperparathyroidism 24-May-2021 09:45:00  Red Astudillo

## 2021-05-24 NOTE — DISCHARGE NOTE PROVIDER - NSDCCPTREATMENT_GEN_ALL_CORE_FT
PRINCIPAL PROCEDURE  Procedure: Thyroidectomy with limited lymphadenectomy  Findings and Treatment:       SECONDARY PROCEDURE  Procedure: Parathyroidectomy, open  Findings and Treatment:

## 2021-05-24 NOTE — CHART NOTE - NSCHARTNOTEFT_GEN_A_CORE
POST-OPERATIVE NOTE    Subjective:  Patient is s/p total thyroidectomy, parathyroidectomy, paratracheal node dissection. Patient seen and examined on floors. Patient denies numbness, tingling. Tolerating diet. Endorses pain well controlled; minor throat discomfort.     Vital Signs Last 24 Hrs  T(C): 36.2 (24 May 2021 13:18), Max: 37.1 (24 May 2021 06:11)  T(F): 97.2 (24 May 2021 13:18), Max: 98.8 (24 May 2021 06:11)  HR: 75 (24 May 2021 13:18) (60 - 75)  BP: 140/75 (24 May 2021 13:18) (136/62 - 158/68)  BP(mean): --  RR: 15 (24 May 2021 13:18) (10 - 15)  SpO2: 100% (24 May 2021 13:18) (100% - 100%)  I&O's Detail    24 May 2021 07:01  -  24 May 2021 14:30  --------------------------------------------------------  IN:    Lactated Ringers: 200 mL    Oral Fluid: 390 mL  Total IN: 590 mL    OUT:    Bulb (mL): 50 mL    Voided (mL): 400 mL  Total OUT: 450 mL    Total NET: 140 mL          PAST MEDICAL & SURGICAL HISTORY:  Thyroid nodule    Primary hyperparathyroidism    Osteoporosis    Fibroadenoma of breast    H/O Graves&#x27; disease  dx 4 yrs ago. treated with methimazole. off meds for 3 yrs    BPPV (benign paroxysmal positional vertigo)    Closed fracture of left wrist with routine healing, subsequent encounter    Derangement of right knee ligament  ACL tear    History of appendectomy    History of tonsillectomy    S/P excision of fibroadenoma of breast    S/P thyroid biopsy          Physical Exam:  General: NAD, resting comfortably in bed  Neck: Soft, no fluctuance or hematoma. Steri-strips c/d/i. QUINN drain in place w/ sanguinous output.  Pulmonary: Nonlabored breathing, no respiratory distress  Cardiovascular: NSR  Extremities: WWP, moving spontaneously      LABS:      Ca    9.3      24 May 2021 11:09        CAPILLARY BLOOD GLUCOSE          Radiology and Additional Studies:    Assessment:  The patient is a 75y Female who is now several hours post-op from a total thyroidectomy, paratracheal node dissection, parathyroidectomy, recovering appropriately on floors    Plan:  - Advance diet as tolerated  - Ca check normal. F/u 2pm Ca.  - Pain control as needed  - OOB and ambulating as tolerated  - no AM labs  - no chemical DVT ppx  - Dispo: Home in AM    Surgery Team C  [id number]

## 2021-05-24 NOTE — DISCHARGE NOTE PROVIDER - CARE PROVIDER_API CALL
Quirino Varner)  Plastic Surgery  [address] [place] [address] [address]  [place] [address]  Phone: [phone]  Fax: [phone]  Follow Up Time:

## 2021-05-24 NOTE — BRIEF OPERATIVE NOTE - NSICDXBRIEFPOSTOP_GEN_ALL_CORE_FT
POST-OP DIAGNOSIS:  Primary hyperparathyroidism 24-May-2021 09:45:10  Red Astudillo  Nontoxic thyroid nodule 24-May-2021 09:45:24  Red Astudillo

## 2021-05-24 NOTE — DISCHARGE NOTE PROVIDER - NSDCCPCAREPLAN_GEN_ALL_CORE_FT
PRINCIPAL DISCHARGE DIAGNOSIS  Diagnosis: Thyroid nodule  Assessment and Plan of Treatment:       SECONDARY DISCHARGE DIAGNOSES  Diagnosis: Hyperparathyroidism  Assessment and Plan of Treatment:

## 2021-05-24 NOTE — BRIEF OPERATIVE NOTE - OPERATION/FINDINGS
Total thyroidectomy, paratracheal node dissection, parathyroidectomy. Right superior parathyroid excised. No ectopic parathyroids found in carotid sheaths bilaterally, thymus, or deep to esophagus. Baseline PTH 95. 5 minute: 25. 10 minute: 19.

## 2021-05-24 NOTE — DISCHARGE NOTE PROVIDER - NSDCMRMEDTOKEN_GEN_ALL_CORE_FT
acetaminophen 325 mg oral tablet: 2 tab(s) orally every 6 hours, As needed, Moderate Pain (4 - 6)  calcium carbonate 500 mg (200 mg elemental calcium) oral tablet, chewable: 1 tab(s) orally every 6 hours  levothyroxine 88 mcg (0.088 mg) oral tablet: 1 tab(s) orally once a day  pantoprazole 40 mg oral delayed release tablet: 1 tab(s) orally once a day  Tylenol 500 mg oral tablet: 2 tab(s) orally once a day  Vitamin B12: 2500 microgram(s) orally 2 times a day  Vitamin D3 5000 intl units (125 mcg) oral capsule: orally 2 times a day   acetaminophen 325 mg oral tablet: 2 tab(s) orally every 6 hours, As needed, Moderate Pain (4 - 6)  calcitriol 0.5 mcg oral capsule: 1 cap(s) orally every 12 hours  calcium carbonate 500 mg (200 mg elemental calcium) oral tablet, chewable: 1 tab(s) orally every 6 hours  levothyroxine 88 mcg (0.088 mg) oral tablet: 1 tab(s) orally once a day  pantoprazole 40 mg oral delayed release tablet: 1 tab(s) orally once a day  Tylenol 500 mg oral tablet: 2 tab(s) orally once a day  Vitamin B12: 2500 microgram(s) orally 2 times a day  Vitamin D3 5000 intl units (125 mcg) oral capsule: orally 2 times a day

## 2021-05-24 NOTE — ASU PREOP CHECKLIST - TEMPERATURE IN FAHRENHEIT (DEGREES F)
Assessment:  Patient is a 10 y.o. male who presents with fracture of shaft of left femur.  met patient's grandmother on elevator. Family member asked  to pray for patient. Intervention:   was ministry of presence. 's offer of prayer was accepted by family member.  provided emotional and spiritual support. Outcome:  Patient expressed gratitude to  for visit and support. Plan:  Chaplains will remain available for spiritual and emotional support and may be paged as needed. 02/03/20 1606   Encounter Summary   Services provided to: Family   Referral/Consult From: Family   Support System Family members   Continue Visiting   (2/3/2020)   Complexity of Encounter Low   Length of Encounter 15 minutes   Routine   Type Follow up   Assessment Tearful; Anxious   Intervention Active listening;Prayer   Outcome Expressed gratitude 98.8

## 2021-05-25 ENCOUNTER — TRANSCRIPTION ENCOUNTER (OUTPATIENT)
Age: 76
End: 2021-05-25

## 2021-05-25 VITALS
OXYGEN SATURATION: 98 % | TEMPERATURE: 98 F | RESPIRATION RATE: 18 BRPM | DIASTOLIC BLOOD PRESSURE: 61 MMHG | SYSTOLIC BLOOD PRESSURE: 131 MMHG | HEART RATE: 78 BPM

## 2021-05-25 LAB
COVID-19 SPIKE DOMAIN AB INTERP: POSITIVE
COVID-19 SPIKE DOMAIN ANTIBODY RESULT: >250 U/ML — HIGH
SARS-COV-2 IGG+IGM SERPL QL IA: >250 U/ML — HIGH
SARS-COV-2 IGG+IGM SERPL QL IA: POSITIVE

## 2021-05-25 RX ORDER — CALCITRIOL 0.5 UG/1
1 CAPSULE ORAL
Qty: 0 | Refills: 0 | DISCHARGE
Start: 2021-05-25

## 2021-05-25 RX ORDER — LEVOTHYROXINE SODIUM 125 MCG
88 TABLET ORAL DAILY
Refills: 0 | Status: DISCONTINUED | OUTPATIENT
Start: 2021-05-25 | End: 2021-05-25

## 2021-05-25 RX ORDER — ONDANSETRON 8 MG/1
4 TABLET, FILM COATED ORAL ONCE
Refills: 0 | Status: COMPLETED | OUTPATIENT
Start: 2021-05-25 | End: 2021-05-25

## 2021-05-25 RX ADMIN — Medication 88 MICROGRAM(S): at 05:27

## 2021-05-25 RX ADMIN — ONDANSETRON 4 MILLIGRAM(S): 8 TABLET, FILM COATED ORAL at 03:49

## 2021-05-25 RX ADMIN — Medication 650 MILLIGRAM(S): at 06:20

## 2021-05-25 RX ADMIN — CALCITRIOL 0.5 MICROGRAM(S): 0.5 CAPSULE ORAL at 05:27

## 2021-05-25 RX ADMIN — Medication 650 MILLIGRAM(S): at 05:27

## 2021-05-25 RX ADMIN — Medication 1 TABLET(S): at 07:17

## 2021-05-25 NOTE — PROGRESS NOTE ADULT - ASSESSMENT
The patient is a 75y Female who is now POD1 s/p total thyroidectomy, paratracheal node dissection, parathyroidectomy, recovering appropriately on floors    Plan:  - Advance diet as tolerated  - Pain control as needed  - OOB and ambulating as tolerated  - SC home    Surgery Team C  [id number].

## 2021-05-25 NOTE — PROVIDER CONTACT NOTE (OTHER) - RECOMMENDATIONS
Provider will order synthroid at this time; provider may discontinue the order and ask the day team why the initial synthroid order was dc'd on days

## 2021-05-25 NOTE — DISCHARGE NOTE NURSING/CASE MANAGEMENT/SOCIAL WORK - NSDCPNINST_GEN_ALL_CORE
Watch for signs of infection; redness, swelling, fever, chills or heat, report such symptoms to the MD. No driving while taking pain medication, it causes drowsiness & constipation. Shower as directed by MD, do not scrub site, allow water to run over incision & pat dry. Do not apply any lotions, ointments or powders to incision. Drink 6-8 glasses of fluids daily to promote hydration. No heavy lifting, pulling or pushing heavy objects. Follow up with primary & surgical MD.

## 2021-05-25 NOTE — PROGRESS NOTE ADULT - SUBJECTIVE AND OBJECTIVE BOX
SUBJECTIVE:   Pt seen and examined at bedside. No acute events overnight. Pt laying in bed comfortably. No nausea, vomiting, chest pain, SOB, fever, chills.        Vital Signs Last 24 Hrs  T(C): 36.6 (24 May 2021 21:18), Max: 37.1 (24 May 2021 06:11)  T(F): 97.9 (24 May 2021 21:18), Max: 98.8 (24 May 2021 06:11)  HR: 67 (24 May 2021 21:18) (60 - 75)  BP: 129/71 (24 May 2021 21:18) (129/71 - 158/68)  BP(mean): --  RR: 16 (24 May 2021 21:18) (10 - 16)  SpO2: 96% (24 May 2021 21:18) (96% - 100%)      PHYSICAL EXAM:  General: NAD, resting comfortably in bed  Neck: Soft, no fluctuance or hematoma. Steri-strips c/d/i. QUINN drain in place w/ sanguinous output.  Pulmonary: Nonlabored breathing, no respiratory distress  Extremities: WWP, moving spontaneously      I&O's Summary    24 May 2021 07:01  -  25 May 2021 00:36  --------------------------------------------------------  IN: 910 mL / OUT: 791 mL / NET: 119 mL      I&O's Detail    24 May 2021 07:01  -  25 May 2021 00:36  --------------------------------------------------------  IN:    Lactated Ringers: 200 mL    Lactated Ringers: 120 mL    Oral Fluid: 590 mL  Total IN: 910 mL    OUT:    Bulb (mL): 90 mL    Voided (mL): 701 mL  Total OUT: 791 mL    Total NET: 119 mL          MEDICATIONS  (STANDING):  calcitriol   Capsule 0.5 MICROGram(s) Oral every 12 hours  calcium carbonate    500 mG (Tums) Chewable 1 Tablet(s) Chew every 6 hours  lactated ringers. 1000 milliLiter(s) (50 mL/Hr) IV Continuous <Continuous>    MEDICATIONS  (PRN):  acetaminophen   Tablet .. 650 milliGRAM(s) Oral every 6 hours PRN Moderate Pain (4 - 6)  oxycodone    5 mG/acetaminophen 325 mG 1 Tablet(s) Oral every 6 hours PRN Severe Pain (7 - 10)      LABS:      Ca    8.4      24 May 2021 19:15            RADIOLOGY & ADDITIONAL STUDIES:
1 day s/p total thyroidectomy and parathyroidectomy. afebrile. no collections. calcium level stable. negative chvostek's sign. now taking adequate po. QUINN removed. discharge home.  f/u in office

## 2021-05-25 NOTE — DISCHARGE NOTE NURSING/CASE MANAGEMENT/SOCIAL WORK - PATIENT PORTAL LINK FT
You can access the FollowMyHealth Patient Portal offered by [facility] by registering at the following website: [email or web address]. By joining [employer]’s FollowMyHealth portal, you will also be able to view your health information using other applications (apps) compatible with our system.

## 2021-06-03 ENCOUNTER — APPOINTMENT (OUTPATIENT)
Dept: SURGERY | Facility: CLINIC | Age: 76
End: 2021-06-03
Payer: MEDICARE

## 2021-06-03 ENCOUNTER — NON-APPOINTMENT (OUTPATIENT)
Age: 76
End: 2021-06-03

## 2021-06-03 PROBLEM — H81.10 BENIGN PAROXYSMAL VERTIGO, UNSPECIFIED EAR: Chronic | Status: ACTIVE | Noted: 2021-05-24

## 2021-06-03 LAB
25(OH)D3 SERPL-MCNC: 53.8 NG/ML
CALCIUM SERPL-MCNC: 9.1 MG/DL
CALCIUM SERPL-MCNC: 9.1 MG/DL
PARATHYROID HORMONE INTACT: 26 PG/ML
SURGICAL PATHOLOGY STUDY: SIGNIFICANT CHANGE UP
T3 SERPL-MCNC: 82 NG/DL
T4 FREE SERPL-MCNC: 1.7 NG/DL
TSH SERPL-ACNC: 1.52 UIU/ML

## 2021-06-03 PROCEDURE — 36415 COLL VENOUS BLD VENIPUNCTURE: CPT

## 2021-06-03 PROCEDURE — 99024 POSTOP FOLLOW-UP VISIT: CPT

## 2021-06-03 NOTE — PHYSICAL EXAM
[de-identified] : well healed scar [Midline] : located in midline position [Normal] : orientation to person, place, and time: normal

## 2021-06-03 NOTE — HISTORY OF PRESENT ILLNESS
[de-identified] : Pt 1 weeks s/p total thyroidectomy and parathyroidectomy doing well c/o weight gain since surgery

## 2021-06-03 NOTE — ASSESSMENT
[FreeTextEntry1] : s/p thyroidectomy and parathyroidectomy\par daily care\par path not ready as of yet Dr Varner to call with path report\par blood work\par f/u Dr Vazquez\par f/u 6 weeks

## 2021-06-04 ENCOUNTER — NON-APPOINTMENT (OUTPATIENT)
Age: 76
End: 2021-06-04

## 2021-06-04 LAB
THYROGLOB AB SERPL-ACNC: <20 IU/ML
THYROPEROXIDASE AB SERPL IA-ACNC: <10 IU/ML

## 2021-06-07 ENCOUNTER — NON-APPOINTMENT (OUTPATIENT)
Age: 76
End: 2021-06-07

## 2021-06-17 NOTE — DISCHARGE NOTE NURSING/CASE MANAGEMENT/SOCIAL WORK - NSPROEXTENSIONSOFSELF_GEN_A_NUR
[facility] Medicine Services  HISTORY AND PHYSICAL    Patient Name: Kelvin French  : 1949  MRN: [id number]  Primary Care Physician: Provider, No Known  Date of admission: 2021    Subjective   Subjective     Chief Complaint:  headache    HPI:  Kelvin French is a 71 y.o. male presents to the ED with complaints of headaches and ear pain.  Patient reports that when he was at the [facility] in [place] he feels that someone implanted something in his ears.  He has been experiencing intermittent pain and buzzing in his ears for years.  He reports having intermittent headaches for the past 2 weeks with associated sensations of warmth coming over his head and radiating down into his chest.  He has been taking Tylenol at home for the past week for his headaches.  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  He denies fever, shortness of air, chest pain, nausea, vomiting, abdominal pain, diarrhea, or any other complaints at this time.  CT head shows senescent changes without acute abnormality.  Chest xray is negative.  Upon arrival his blood pressure was 248/135, and he was started on a cardene drip in the ED.  Patient is being admitted to the Hospitalist for further evaluation and management.    COVID Details:        Symptoms: [x] NONE [] Fever []  Cough [] Shortness of breath [] Change in taste or smell    Patient received 2 doses of the Moderna vaccine    Review of Systems   Constitutional: Negative.    HENT: Positive for ear pain. Negative for congestion, sinus pressure, sinus pain and sore throat.    Eyes: Positive for photophobia.   Respiratory: Negative.    Cardiovascular: Negative.    Gastrointestinal: Negative.    Endocrine: Negative.    Genitourinary: Negative.    Musculoskeletal: Negative.    Skin: Negative.    Allergic/Immunologic: Negative.    Neurological: Positive for headaches.    Hematological: Negative.    Psychiatric/Behavioral: Negative.         All other systems reviewed and are negative.     Personal History     Past Medical History:   Diagnosis Date   • Peptic ulceration        No past surgical history on file.    Family History:   pertinent FHx was reviewed and unremarkable.     Social History:  reports that he has quit smoking. He has never used smokeless tobacco. He reports previous alcohol use. He reports that he does not use drugs.  Social History     Social History Narrative   • Not on file       Medications:  acetaminophen    No Known Allergies    Objective   Objective     Vital Signs:   Temp:  [98 °F (36.7 °C)] 98 °F (36.7 °C)  Heart Rate:  [80-93] 90  Resp:  [16] 16  BP: (159-248)/() 178/87    Physical Exam   Constitutional: Awake, alert, resting in bed  Eyes: PERRLA, sclerae anicteric, no conjunctival injection  HENT: NCAT, mucous membranes moist  Neck: Supple, no thyromegaly, no lymphadenopathy, trachea midline  Respiratory: Clear to auscultation bilaterally, nonlabored respirations   Cardiovascular: RRR, no murmurs, rubs, or gallops, palpable pedal pulses bilaterally  Gastrointestinal: Positive bowel sounds, soft, nontender, nondistended  Musculoskeletal: No bilateral ankle edema, no clubbing or cyanosis to extremities  Psychiatric: Appropriate affect, cooperative  Neurologic: Oriented x 3, strength symmetric in all extremities, Cranial Nerves grossly intact to confrontation, speech clear  Skin: No rashes       Result Review:  I have personally reviewed the results from the time of this admission to 06/17/21 2:44 AM EDT and agree with these findings:  [x]  Laboratory  []  Microbiology  [x]  Radiology  [x]  EKG/Telemetry   []  Cardiology/Vascular   []  Pathology  []  Old records  []  Other:  Most notable findings include:       LAB RESULTS:      Lab 06/16/21  2346   WBC 5.44   HEMOGLOBIN 13.6   HEMATOCRIT 42.8   PLATELETS 245   NEUTROS ABS 2.34   IMMATURE GRANS (ABS)  0.00   LYMPHS ABS 2.49   MONOS ABS 0.42   EOS ABS 0.14   MCV 83.3         Lab 06/16/21  2346   SODIUM 140   POTASSIUM 3.9   CHLORIDE 101   CO2 29.0   ANION GAP 10.0   BUN 22   CREATININE 1.48*   GLUCOSE 131*   CALCIUM 10.4         Lab 06/16/21  2346   TOTAL PROTEIN 7.5   ALBUMIN 4.50   GLOBULIN 3.0   ALT (SGPT) 28   AST (SGOT) 58*   BILIRUBIN 0.6   ALK PHOS 58         Lab 06/16/21  2346   PROBNP 75.9   TROPONIN T <0.010                   Microbiology Results (last 10 days)     ** No results found for the last 240 hours. **          CT Head Without Contrast    Result Date: 6/17/2021  CT Head WO HISTORY: Headache and hypertension today. TECHNIQUE: Axial unenhanced head CT with multiplanar reformats. Radiation dose reduction techniques included automated exposure control or exposure modulation based on body size. Count of known CT and cardiac nuc med studies performed in previous 12 months: 0. COMPARISON: None. FINDINGS: Ventricular size and configuration are normal. No acute infarct or hemorrhage is identified. There are no masses. There is no skull fracture.  There is atrophy with chronic small vessel ischemic disease in the white matter. Visualized paranasal sinuses and mastoid air cells are clear. There is a small lipoma near the basilar tip measuring about 7 x 6 x 6 mm in size.     Impression: Senescent changes without acute abnormality. Signer Name: Osmar Johnson MD  Signed: 6/17/2021 12:58 AM  Workstation Name: [facility]  [facility]    CT Angiogram Neck    Result Date: 6/17/2021  CTA Head, CTA Neck INDICATION: Hypertension and headache. TECHNIQUE: CT angiogram of the head and neck with and without IV contrast. 3-D reconstructions were obtained and reviewed.  Evaluation for a significant carotid arterial stenosis is based on the NASCET criteria. Radiation dose reduction techniques included automated exposure control or exposure modulation based on body size. Count of known CT and  cardiac nuc med studies performed in previous 12 months: 1. COMPARISON: Head CT same day FINDINGS: CTA neck:  Please see chest CTA report dictated separately. The left vertebral artery has a separate origin from the aortic arch as an anatomic variant. The left vertebral artery is widely patent and forms a normal caliber basilar artery. The right vertebral artery is markedly diminutive in size and overall poorly characterized. This may very well be a chronic finding as there are no comparison studies. However, an acute right vertebral artery abnormality such as a dissection cannot be excluded. The carotid vessels are tortuous. However, the carotid arteries are widely patent on both sides of the neck. There is no plaque or evidence of stenosis on either side. CTA head:  The basilar artery is narrow in caliber but widely patent. This appears to be secondary to an anterior dominant circulation with bilateral fetal origin PCAs. No intracranial flow-limiting stenosis or focal vessel cut off is seen. There is no aneurysm. Major dural venous sinuses are patent. No pathologic enhancement is identified in the brain.     Impression: 1. The carotid arteries in the neck are normal and widely patent. The left vertebral artery is normal. 2. The right vertebral artery is diminutive in caliber throughout its length and poorly characterized. This may be a chronic finding, possibly congenital. Acute abnormality such as dissection unfortunately is not excluded without comparison studies. 3. Anterior dominant intracranial circulation with bilateral fetal origin PCAs. No intracranial flow limiting stenosis or vessel cutoff is seen. There is no aneurysm. Signer Name: Osmar Johnson MD  Signed: 6/17/2021 3:28 AM  Workstation Name: [facility]  [facility]    CT Angiogram Chest With & Without Contrast    Result Date: 6/17/2021  CT ANGIOGRAM CHEST W WO CONTRAST INDICATION: Chest pain. Severe hypertension. TECHNIQUE:  CT angiogram of the chest with and without IV contrast. 3-D reconstructions were obtained and reviewed.   Radiation dose reduction techniques included automated exposure control or exposure modulation based on body size. Count of known CT and cardiac nuc med studies performed in previous 12 months: 1. COMPARISON: None available. FINDINGS: There is no aortic aneurysm or aortic dissection to the level of the renal arteries. The great vessel origins are normal. The left vertebral artery has a separate origin from the aortic arch. No pulmonary embolism is identified. There is no pleural or pericardial effusion. There is no adenopathy. Upper abdominal images show hepatic steatosis. Small hiatal hernia is present. The lungs are clear. No CT findings present to indicate pneumonia. Note: CT may be negative in the earliest stages of COVID-19.     Impression: 1. No aortic aneurysm or aortic dissection. No pulmonary embolism. 2. No acute findings in the chest. 3. Small hiatal hernia. 4. Hepatic steatosis. Signer Name: Osmar Johnson MD  Signed: 6/17/2021 3:20 AM  Workstation Name: [facility]  [facility]    XR Chest 1 View    Result Date: 6/17/2021  CR Chest 1 Vw INDICATION: Hypertension and headache. COMPARISON:  None available. FINDINGS: Single portable AP view(s) of the chest.  There is mild cardiomegaly. Mediastinal contours are otherwise normal. Pulmonary vascularity is normal. The lungs are clear. No pneumothorax is seen. There is mild eventration of the right hemidiaphragm.     Impression: No acute cardiopulmonary findings. Signer Name: Osmar Johnson MD  Signed: 6/17/2021 12:00 AM  Workstation Name: [facility]    CT Angiogram Head    Result Date: 6/17/2021  CTA Head, CTA Neck INDICATION: Hypertension and headache. TECHNIQUE: CT angiogram of the head and neck with and without IV contrast. 3-D reconstructions were obtained and reviewed.  Evaluation for a  significant carotid arterial stenosis is based on the NASCET criteria. Radiation dose reduction techniques included automated exposure control or exposure modulation based on body size. Count of known CT and cardiac nuc med studies performed in previous 12 months: 1. COMPARISON: Head CT same day FINDINGS: CTA neck:  Please see chest CTA report dictated separately. The left vertebral artery has a separate origin from the aortic arch as an anatomic variant. The left vertebral artery is widely patent and forms a normal caliber basilar artery. The right vertebral artery is markedly diminutive in size and overall poorly characterized. This may very well be a chronic finding as there are no comparison studies. However, an acute right vertebral artery abnormality such as a dissection cannot be excluded. The carotid vessels are tortuous. However, the carotid arteries are widely patent on both sides of the neck. There is no plaque or evidence of stenosis on either side. CTA head:  The basilar artery is narrow in caliber but widely patent. This appears to be secondary to an anterior dominant circulation with bilateral fetal origin PCAs. No intracranial flow-limiting stenosis or focal vessel cut off is seen. There is no aneurysm. Major dural venous sinuses are patent. No pathologic enhancement is identified in the brain.     Impression: 1. The carotid arteries in the neck are normal and widely patent. The left vertebral artery is normal. 2. The right vertebral artery is diminutive in caliber throughout its length and poorly characterized. This may be a chronic finding, possibly congenital. Acute abnormality such as dissection unfortunately is not excluded without comparison studies. 3. Anterior dominant intracranial circulation with bilateral fetal origin PCAs. No intracranial flow limiting stenosis or vessel cutoff is seen. There is no aneurysm. Signer Name: Osmar Johnson MD  Signed: 6/17/2021 3:28 AM  Workstation Name:  "[facility]  [facility] [place] [facility]          Assessment/Plan   Assessment & Plan       Hypertensive urgency    Elevated serum creatinine    Hyperglycemia    Kelvin French is a 71 y.o. male presents to the ED with complaints of headaches.    Assessment and Plan:    Hypertensive urgency  Headache  --Hydralazine x 1 dose given in the ED  --CTA chest--No acute findings, no aortic aneurysm or aortic dissection, no PE  --CT head shows senescent changes without acute abnormality  --CTA head and neck  --continue cardene drip--maintain -190 for first 24 hours. Sensitive to this med and had to be turned off due to rapid correction. Suspect he would benefit from nifedipine orally after 24 hours.   --tylenol for HA  --am labs    Elevated creatinine  --creatinine 1.48, baseline unknown  --urine studies  --bmp in the am    Hyperglycemia  --a1c in the am    DVT prophylaxis:  mechanical    CODE STATUS:  full  Level Of Support Discussed With: Patient  Code Status: CPR  Medical Interventions (Level of Support Prior to Arrest): Full      This note has been completed as part of a split-shared workflow.   Electronically signed by RACHEL Ordaz, 06/17/21, 3:44 AM EDT.           Attending   Admission Attestation       I have seen and examined the patient, performing an independent face-to-face diagnostic evaluation with plan of care reviewed and developed with the advanced practice clinician (APC).      Brief Summary Statement:   Kelvin French is a 71 y.o. male with no known past medical history who presents to the ER with complaints of headache and ear pain.    Patient reports that he was recently at the [facility] and [place] where he feels like someone \"implanted something in my ears\".  Since that time he has had intermittent pain in his ears bilaterally, headaches, and \"excruciating\" warm sensation that radiates from his head and down to his chest.    He has been taking Tylenol for the headaches " which has not improved his pain.  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    Denies any chest pain or pressure, unilateral weakness, syncope, shortness of air, fever, chills, cough, or any other symptoms.    Work-up in the ER reveals significantly elevated blood pressure minimally responsive to hydralazine.  Patient significantly sensitive to Cardene and had to be discontinued and transition to nitroglycerin drip.    Remainder of detailed HPI is as noted by APC and has been reviewed and/or edited by me for completeness.    Attending Physical Exam:  Constitutional: Awake, alert  Eyes: PERRLA, sclerae anicteric, no conjunctival injection decreased vision bilaterally worse on the left where he is nearly completely blind which is chronic  HENT: NCAT, mucous membranes moist  Neck: Supple, no thyromegaly, no lymphadenopathy, trachea midline  Respiratory: Clear to auscultation bilaterally, nonlabored respirations   Cardiovascular: RRR, no murmurs, rubs, or gallops, palpable pedal pulses bilaterally  Gastrointestinal: Positive bowel sounds, soft, nontender, nondistended  Musculoskeletal: No bilateral ankle edema, no clubbing or cyanosis to extremities  Psychiatric: Appropriate affect, cooperative  Neurologic: Oriented x 3, strength symmetric in all extremities, Cranial Nerves grossly intact to confrontation, speech clear  Skin: No rashes    Brief Assessment/Plan :  See detailed assessment and plan developed with APC which I have reviewed and/or edited for completeness.        Admission Status: I believe that this patient meets INPATIENT status due to hypertensive urgency.  I feel patient’s risk for adverse outcomes and need for care warrant INPATIENT evaluation and I predict the patient’s care encounter to likely last beyond 2 midnights.        Tyrell Perez, DO  06/17/21                     eyeglasses

## 2021-06-28 ENCOUNTER — OUTPATIENT (OUTPATIENT)
Dept: OUTPATIENT SERVICES | Facility: HOSPITAL | Age: 76
LOS: 1 days | End: 2021-06-28
Payer: MEDICARE

## 2021-06-28 ENCOUNTER — APPOINTMENT (OUTPATIENT)
Dept: NUCLEAR MEDICINE | Facility: HOSPITAL | Age: 76
End: 2021-06-28
Payer: MEDICARE

## 2021-06-28 DIAGNOSIS — Z90.49 ACQUIRED ABSENCE OF OTHER SPECIFIED PARTS OF DIGESTIVE TRACT: Chronic | ICD-10-CM

## 2021-06-28 DIAGNOSIS — Z98.890 OTHER SPECIFIED POSTPROCEDURAL STATES: Chronic | ICD-10-CM

## 2021-06-28 DIAGNOSIS — Z90.89 ACQUIRED ABSENCE OF OTHER ORGANS: Chronic | ICD-10-CM

## 2021-06-28 DIAGNOSIS — S62.102D FRACTURE OF UNSPECIFIED CARPAL BONE, LEFT WRIST, SUBSEQUENT ENCOUNTER FOR FRACTURE WITH ROUTINE HEALING: Chronic | ICD-10-CM

## 2021-06-28 DIAGNOSIS — C73 MALIGNANT NEOPLASM OF THYROID GLAND: ICD-10-CM

## 2021-06-28 DIAGNOSIS — M23.8X1 OTHER INTERNAL DERANGEMENTS OF RIGHT KNEE: Chronic | ICD-10-CM

## 2021-06-29 ENCOUNTER — APPOINTMENT (OUTPATIENT)
Dept: NUCLEAR MEDICINE | Facility: HOSPITAL | Age: 76
End: 2021-06-29

## 2021-06-30 ENCOUNTER — APPOINTMENT (OUTPATIENT)
Dept: NUCLEAR MEDICINE | Facility: HOSPITAL | Age: 76
End: 2021-06-30

## 2021-07-02 ENCOUNTER — APPOINTMENT (OUTPATIENT)
Dept: NUCLEAR MEDICINE | Facility: HOSPITAL | Age: 76
End: 2021-07-02

## 2021-07-02 ENCOUNTER — RESULT REVIEW (OUTPATIENT)
Age: 76
End: 2021-07-02

## 2021-07-02 PROCEDURE — 78830 RP LOCLZJ TUM SPECT W/CT 1: CPT

## 2021-07-02 PROCEDURE — 78830 RP LOCLZJ TUM SPECT W/CT 1: CPT | Mod: 26

## 2021-07-02 PROCEDURE — 78018 THYROID MET IMAGING BODY: CPT

## 2021-07-02 PROCEDURE — 78020 THYROID MET UPTAKE: CPT

## 2021-07-02 PROCEDURE — A9528: CPT

## 2021-07-02 PROCEDURE — 78020 THYROID MET UPTAKE: CPT | Mod: 26,MH

## 2021-07-02 PROCEDURE — 78018 THYROID MET IMAGING BODY: CPT | Mod: 26,MH

## 2021-07-02 PROCEDURE — 96372 THER/PROPH/DIAG INJ SC/IM: CPT

## 2021-07-06 ENCOUNTER — APPOINTMENT (OUTPATIENT)
Dept: NUCLEAR MEDICINE | Facility: HOSPITAL | Age: 76
End: 2021-07-06

## 2021-07-06 ENCOUNTER — OUTPATIENT (OUTPATIENT)
Dept: OUTPATIENT SERVICES | Facility: HOSPITAL | Age: 76
LOS: 1 days | End: 2021-07-06
Payer: MEDICARE

## 2021-07-06 DIAGNOSIS — Z90.49 ACQUIRED ABSENCE OF OTHER SPECIFIED PARTS OF DIGESTIVE TRACT: Chronic | ICD-10-CM

## 2021-07-06 DIAGNOSIS — S62.102D FRACTURE OF UNSPECIFIED CARPAL BONE, LEFT WRIST, SUBSEQUENT ENCOUNTER FOR FRACTURE WITH ROUTINE HEALING: Chronic | ICD-10-CM

## 2021-07-06 DIAGNOSIS — C73 MALIGNANT NEOPLASM OF THYROID GLAND: ICD-10-CM

## 2021-07-06 DIAGNOSIS — Z98.890 OTHER SPECIFIED POSTPROCEDURAL STATES: Chronic | ICD-10-CM

## 2021-07-06 DIAGNOSIS — M23.8X1 OTHER INTERNAL DERANGEMENTS OF RIGHT KNEE: Chronic | ICD-10-CM

## 2021-07-06 DIAGNOSIS — Z90.89 ACQUIRED ABSENCE OF OTHER ORGANS: Chronic | ICD-10-CM

## 2021-07-07 ENCOUNTER — APPOINTMENT (OUTPATIENT)
Dept: NUCLEAR MEDICINE | Facility: HOSPITAL | Age: 76
End: 2021-07-07

## 2021-07-08 ENCOUNTER — APPOINTMENT (OUTPATIENT)
Dept: NUCLEAR MEDICINE | Facility: HOSPITAL | Age: 76
End: 2021-07-08

## 2021-07-08 PROCEDURE — 79005 NUCLEAR RX ORAL ADMIN: CPT | Mod: 26

## 2021-07-14 ENCOUNTER — APPOINTMENT (OUTPATIENT)
Dept: NUCLEAR MEDICINE | Facility: HOSPITAL | Age: 76
End: 2021-07-14

## 2021-07-14 PROCEDURE — 96372 THER/PROPH/DIAG INJ SC/IM: CPT

## 2021-07-14 PROCEDURE — 79005 NUCLEAR RX ORAL ADMIN: CPT

## 2021-07-14 PROCEDURE — 78018 THYROID MET IMAGING BODY: CPT | Mod: 26,MH

## 2021-07-14 PROCEDURE — A9517: CPT

## 2021-07-14 PROCEDURE — 78018 THYROID MET IMAGING BODY: CPT

## 2021-07-28 ENCOUNTER — APPOINTMENT (OUTPATIENT)
Dept: CT IMAGING | Facility: CLINIC | Age: 76
End: 2021-07-28
Payer: MEDICARE

## 2021-07-28 ENCOUNTER — OUTPATIENT (OUTPATIENT)
Dept: OUTPATIENT SERVICES | Facility: HOSPITAL | Age: 76
LOS: 1 days | End: 2021-07-28
Payer: MEDICARE

## 2021-07-28 DIAGNOSIS — M23.8X1 OTHER INTERNAL DERANGEMENTS OF RIGHT KNEE: Chronic | ICD-10-CM

## 2021-07-28 DIAGNOSIS — Z90.49 ACQUIRED ABSENCE OF OTHER SPECIFIED PARTS OF DIGESTIVE TRACT: Chronic | ICD-10-CM

## 2021-07-28 DIAGNOSIS — S62.102D FRACTURE OF UNSPECIFIED CARPAL BONE, LEFT WRIST, SUBSEQUENT ENCOUNTER FOR FRACTURE WITH ROUTINE HEALING: Chronic | ICD-10-CM

## 2021-07-28 DIAGNOSIS — Z98.890 OTHER SPECIFIED POSTPROCEDURAL STATES: Chronic | ICD-10-CM

## 2021-07-28 DIAGNOSIS — R91.1 SOLITARY PULMONARY NODULE: ICD-10-CM

## 2021-07-28 DIAGNOSIS — Z90.89 ACQUIRED ABSENCE OF OTHER ORGANS: Chronic | ICD-10-CM

## 2021-07-28 PROCEDURE — G1004: CPT

## 2021-07-28 PROCEDURE — 71250 CT THORAX DX C-: CPT | Mod: 26,ME

## 2021-07-28 PROCEDURE — 71250 CT THORAX DX C-: CPT | Mod: ME

## 2021-08-10 ENCOUNTER — APPOINTMENT (OUTPATIENT)
Dept: SURGERY | Facility: CLINIC | Age: 76
End: 2021-08-10
Payer: MEDICARE

## 2021-08-10 PROCEDURE — 99024 POSTOP FOLLOW-UP VISIT: CPT

## 2021-08-10 RX ORDER — LEVOTHYROXINE SODIUM 0.09 MG/1
88 TABLET ORAL
Qty: 30 | Refills: 0 | Status: COMPLETED | COMMUNITY
Start: 2021-05-24

## 2021-08-10 RX ORDER — LIDOCAINE AND PRILOCAINE 25; 25 MG/G; MG/G
2.5-2.5 CREAM TOPICAL
Qty: 30 | Refills: 0 | Status: COMPLETED | COMMUNITY
Start: 2021-06-17

## 2021-08-10 RX ORDER — LEVOTHYROXINE SODIUM 0.1 MG/1
100 TABLET ORAL
Qty: 90 | Refills: 0 | Status: ACTIVE | COMMUNITY
Start: 2021-06-11

## 2021-08-10 NOTE — ASSESSMENT
[FreeTextEntry1] : s/p Thyroidectomy and parathyroidectomy\par daily care\par CT scan results discussed follow up with PCP\par labs and scans per DR Vazquez\par f/u 4 months

## 2021-08-10 NOTE — HISTORY OF PRESENT ILLNESS
[de-identified] : Pt 2 months s/p thyroidectomy and parathyroidectomy Pt feels well on Synthroid 100 mcg 6 days per week

## 2021-08-10 NOTE — PHYSICAL EXAM
[de-identified] : well healed scar [Midline] : located in midline position [Normal] : orientation to person, place, and time: normal

## 2021-10-06 PROBLEM — N81.10 FEMALE CYSTOCELE: Status: ACTIVE | Noted: 2018-04-10

## 2021-10-26 ENCOUNTER — APPOINTMENT (OUTPATIENT)
Dept: RADIOLOGY | Facility: CLINIC | Age: 76
End: 2021-10-26

## 2021-12-14 ENCOUNTER — APPOINTMENT (OUTPATIENT)
Dept: SURGERY | Facility: CLINIC | Age: 76
End: 2021-12-14
Payer: MEDICARE

## 2021-12-14 DIAGNOSIS — C73 MALIGNANT NEOPLASM OF THYROID GLAND: ICD-10-CM

## 2021-12-14 DIAGNOSIS — E21.3 HYPERPARATHYROIDISM, UNSPECIFIED: ICD-10-CM

## 2021-12-14 PROCEDURE — 99213 OFFICE O/P EST LOW 20 MIN: CPT

## 2021-12-14 RX ORDER — CIPROFLOXACIN HYDROCHLORIDE 500 MG/1
500 TABLET, FILM COATED ORAL TWICE DAILY
Qty: 6 | Refills: 0 | Status: COMPLETED | COMMUNITY
Start: 2017-05-22 | End: 2021-12-14

## 2021-12-14 RX ORDER — AZITHROMYCIN 250 MG/1
250 TABLET, FILM COATED ORAL
Qty: 4 | Refills: 0 | Status: COMPLETED | COMMUNITY
Start: 2017-05-22 | End: 2021-12-14

## 2021-12-14 NOTE — HISTORY OF PRESENT ILLNESS
[de-identified] : Pt 6 months s/p Thyroidectomy and parathyroidectomy doing well without complaints Treated with [facility] by Dr Vazquez in July. Pt feels well on Synthroid 100 mcg 6 days per week will have blood work done for Dr Vazquez

## 2021-12-14 NOTE — ASSESSMENT
[FreeTextEntry1] : s/p Thyroidectomy and parathyroidectomy\par labs and scans per Dr Vazquez\par f/u 6 months

## 2021-12-14 NOTE — PHYSICAL EXAM
[de-identified] : well healed scar [Midline] : located in midline position [Normal] : orientation to person, place, and time: normal

## 2022-03-03 ENCOUNTER — OUTPATIENT (OUTPATIENT)
Dept: OUTPATIENT SERVICES | Facility: HOSPITAL | Age: 77
LOS: 1 days | End: 2022-03-03
Payer: MEDICARE

## 2022-03-03 ENCOUNTER — APPOINTMENT (OUTPATIENT)
Dept: ULTRASOUND IMAGING | Facility: CLINIC | Age: 77
End: 2022-03-03
Payer: MEDICARE

## 2022-03-03 DIAGNOSIS — S62.102D FRACTURE OF UNSPECIFIED CARPAL BONE, LEFT WRIST, SUBSEQUENT ENCOUNTER FOR FRACTURE WITH ROUTINE HEALING: Chronic | ICD-10-CM

## 2022-03-03 DIAGNOSIS — Z90.89 ACQUIRED ABSENCE OF OTHER ORGANS: Chronic | ICD-10-CM

## 2022-03-03 DIAGNOSIS — Z98.890 OTHER SPECIFIED POSTPROCEDURAL STATES: Chronic | ICD-10-CM

## 2022-03-03 DIAGNOSIS — M23.8X1 OTHER INTERNAL DERANGEMENTS OF RIGHT KNEE: Chronic | ICD-10-CM

## 2022-03-03 DIAGNOSIS — Z90.49 ACQUIRED ABSENCE OF OTHER SPECIFIED PARTS OF DIGESTIVE TRACT: Chronic | ICD-10-CM

## 2022-03-03 DIAGNOSIS — Z00.8 ENCOUNTER FOR OTHER GENERAL EXAMINATION: ICD-10-CM

## 2022-03-03 PROCEDURE — 76856 US EXAM PELVIC COMPLETE: CPT | Mod: 26

## 2022-03-03 PROCEDURE — 76856 US EXAM PELVIC COMPLETE: CPT

## 2022-03-03 PROCEDURE — 76830 TRANSVAGINAL US NON-OB: CPT

## 2022-03-03 PROCEDURE — 76830 TRANSVAGINAL US NON-OB: CPT | Mod: 26

## 2022-03-17 ENCOUNTER — OUTPATIENT (OUTPATIENT)
Dept: OUTPATIENT SERVICES | Facility: HOSPITAL | Age: 77
LOS: 1 days | End: 2022-03-17
Payer: MEDICARE

## 2022-03-17 ENCOUNTER — APPOINTMENT (OUTPATIENT)
Dept: RADIOLOGY | Facility: IMAGING CENTER | Age: 77
End: 2022-03-17
Payer: MEDICARE

## 2022-03-17 DIAGNOSIS — Z90.89 ACQUIRED ABSENCE OF OTHER ORGANS: Chronic | ICD-10-CM

## 2022-03-17 DIAGNOSIS — S62.102D FRACTURE OF UNSPECIFIED CARPAL BONE, LEFT WRIST, SUBSEQUENT ENCOUNTER FOR FRACTURE WITH ROUTINE HEALING: Chronic | ICD-10-CM

## 2022-03-17 DIAGNOSIS — Z98.890 OTHER SPECIFIED POSTPROCEDURAL STATES: Chronic | ICD-10-CM

## 2022-03-17 DIAGNOSIS — M23.8X1 OTHER INTERNAL DERANGEMENTS OF RIGHT KNEE: Chronic | ICD-10-CM

## 2022-03-17 DIAGNOSIS — Z00.8 ENCOUNTER FOR OTHER GENERAL EXAMINATION: ICD-10-CM

## 2022-03-17 DIAGNOSIS — Z90.49 ACQUIRED ABSENCE OF OTHER SPECIFIED PARTS OF DIGESTIVE TRACT: Chronic | ICD-10-CM

## 2022-03-17 PROCEDURE — 77085 DXA BONE DENSITY AXL VRT FX: CPT

## 2022-03-17 PROCEDURE — 77085 DXA BONE DENSITY AXL VRT FX: CPT | Mod: 26

## 2022-04-05 ENCOUNTER — OUTPATIENT (OUTPATIENT)
Dept: OUTPATIENT SERVICES | Facility: HOSPITAL | Age: 77
LOS: 1 days | End: 2022-04-05
Payer: MEDICARE

## 2022-04-05 ENCOUNTER — APPOINTMENT (OUTPATIENT)
Dept: ULTRASOUND IMAGING | Facility: CLINIC | Age: 77
End: 2022-04-05
Payer: MEDICARE

## 2022-04-05 ENCOUNTER — APPOINTMENT (OUTPATIENT)
Dept: MAMMOGRAPHY | Facility: CLINIC | Age: 77
End: 2022-04-05
Payer: MEDICARE

## 2022-04-05 DIAGNOSIS — Z90.49 ACQUIRED ABSENCE OF OTHER SPECIFIED PARTS OF DIGESTIVE TRACT: Chronic | ICD-10-CM

## 2022-04-05 DIAGNOSIS — S62.102D FRACTURE OF UNSPECIFIED CARPAL BONE, LEFT WRIST, SUBSEQUENT ENCOUNTER FOR FRACTURE WITH ROUTINE HEALING: Chronic | ICD-10-CM

## 2022-04-05 DIAGNOSIS — Z98.890 OTHER SPECIFIED POSTPROCEDURAL STATES: Chronic | ICD-10-CM

## 2022-04-05 DIAGNOSIS — Z12.31 ENCOUNTER FOR SCREENING MAMMOGRAM FOR MALIGNANT NEOPLASM OF BREAST: ICD-10-CM

## 2022-04-05 DIAGNOSIS — Z90.89 ACQUIRED ABSENCE OF OTHER ORGANS: Chronic | ICD-10-CM

## 2022-04-05 DIAGNOSIS — M23.8X1 OTHER INTERNAL DERANGEMENTS OF RIGHT KNEE: Chronic | ICD-10-CM

## 2022-04-05 PROCEDURE — 77063 BREAST TOMOSYNTHESIS BI: CPT | Mod: 26

## 2022-04-05 PROCEDURE — 76641 ULTRASOUND BREAST COMPLETE: CPT | Mod: 26,50

## 2022-04-05 PROCEDURE — 77067 SCR MAMMO BI INCL CAD: CPT

## 2022-04-05 PROCEDURE — 76641 ULTRASOUND BREAST COMPLETE: CPT

## 2022-04-05 PROCEDURE — 77067 SCR MAMMO BI INCL CAD: CPT | Mod: 26

## 2022-04-05 PROCEDURE — 77063 BREAST TOMOSYNTHESIS BI: CPT

## 2022-05-02 DIAGNOSIS — Z01.812 ENCOUNTER FOR PREPROCEDURAL LABORATORY EXAMINATION: ICD-10-CM

## 2022-05-25 ENCOUNTER — NON-APPOINTMENT (OUTPATIENT)
Age: 77
End: 2022-05-25

## 2022-05-26 ENCOUNTER — APPOINTMENT (OUTPATIENT)
Dept: UROGYNECOLOGY | Facility: CLINIC | Age: 77
End: 2022-05-26
Payer: MEDICARE

## 2022-05-26 VITALS
TEMPERATURE: 97.1 F | DIASTOLIC BLOOD PRESSURE: 68 MMHG | HEART RATE: 76 BPM | OXYGEN SATURATION: 97 % | SYSTOLIC BLOOD PRESSURE: 142 MMHG | HEIGHT: 59 IN | WEIGHT: 105 LBS | BODY MASS INDEX: 21.17 KG/M2

## 2022-05-26 PROCEDURE — 99213 OFFICE O/P EST LOW 20 MIN: CPT

## 2022-05-26 RX ORDER — ESTRADIOL 0.1 MG/G
0.1 CREAM VAGINAL
Qty: 1 | Refills: 3 | Status: ACTIVE | COMMUNITY
Start: 2020-10-20 | End: 1900-01-01

## 2022-06-16 ENCOUNTER — APPOINTMENT (OUTPATIENT)
Dept: SURGERY | Facility: CLINIC | Age: 77
End: 2022-06-16

## 2022-07-01 ENCOUNTER — NON-APPOINTMENT (OUTPATIENT)
Age: 77
End: 2022-07-01

## 2022-07-02 ENCOUNTER — TRANSCRIPTION ENCOUNTER (OUTPATIENT)
Age: 77
End: 2022-07-02

## 2022-07-05 ENCOUNTER — APPOINTMENT (OUTPATIENT)
Dept: DISASTER EMERGENCY | Facility: HOSPITAL | Age: 77
End: 2022-07-05

## 2022-07-25 ENCOUNTER — OUTPATIENT (OUTPATIENT)
Dept: OUTPATIENT SERVICES | Facility: HOSPITAL | Age: 77
LOS: 1 days | End: 2022-07-25
Payer: MEDICARE

## 2022-07-25 ENCOUNTER — APPOINTMENT (OUTPATIENT)
Dept: NUCLEAR MEDICINE | Facility: HOSPITAL | Age: 77
End: 2022-07-25

## 2022-07-25 DIAGNOSIS — Z98.890 OTHER SPECIFIED POSTPROCEDURAL STATES: Chronic | ICD-10-CM

## 2022-07-25 DIAGNOSIS — C73 MALIGNANT NEOPLASM OF THYROID GLAND: ICD-10-CM

## 2022-07-25 DIAGNOSIS — S62.102D FRACTURE OF UNSPECIFIED CARPAL BONE, LEFT WRIST, SUBSEQUENT ENCOUNTER FOR FRACTURE WITH ROUTINE HEALING: Chronic | ICD-10-CM

## 2022-07-25 DIAGNOSIS — M23.8X1 OTHER INTERNAL DERANGEMENTS OF RIGHT KNEE: Chronic | ICD-10-CM

## 2022-07-25 DIAGNOSIS — Z90.89 ACQUIRED ABSENCE OF OTHER ORGANS: Chronic | ICD-10-CM

## 2022-07-25 DIAGNOSIS — Z90.49 ACQUIRED ABSENCE OF OTHER SPECIFIED PARTS OF DIGESTIVE TRACT: Chronic | ICD-10-CM

## 2022-07-26 ENCOUNTER — APPOINTMENT (OUTPATIENT)
Dept: NUCLEAR MEDICINE | Facility: HOSPITAL | Age: 77
End: 2022-07-26

## 2022-07-27 ENCOUNTER — APPOINTMENT (OUTPATIENT)
Dept: NUCLEAR MEDICINE | Facility: HOSPITAL | Age: 77
End: 2022-07-27

## 2022-07-29 ENCOUNTER — APPOINTMENT (OUTPATIENT)
Dept: NUCLEAR MEDICINE | Facility: HOSPITAL | Age: 77
End: 2022-07-29

## 2022-07-29 PROCEDURE — 99213 OFFICE O/P EST LOW 20 MIN: CPT

## 2022-07-29 PROCEDURE — A9528: CPT

## 2022-07-29 PROCEDURE — 78018 THYROID MET IMAGING BODY: CPT

## 2022-07-29 PROCEDURE — 78018 THYROID MET IMAGING BODY: CPT | Mod: 26,MH

## 2022-07-29 PROCEDURE — 96372 THER/PROPH/DIAG INJ SC/IM: CPT

## 2022-07-29 PROCEDURE — 78020 THYROID MET UPTAKE: CPT

## 2022-07-29 PROCEDURE — 78020 THYROID MET UPTAKE: CPT | Mod: 26,MH

## 2022-09-17 LAB — SARS-COV-2 N GENE NPH QL NAA+PROBE: NOT DETECTED

## 2022-09-21 ENCOUNTER — APPOINTMENT (OUTPATIENT)
Dept: PULMONOLOGY | Facility: CLINIC | Age: 77
End: 2022-09-21

## 2022-09-21 VITALS
SYSTOLIC BLOOD PRESSURE: 120 MMHG | BODY MASS INDEX: 21.37 KG/M2 | HEART RATE: 81 BPM | OXYGEN SATURATION: 97 % | WEIGHT: 106 LBS | TEMPERATURE: 97.6 F | HEIGHT: 59 IN | DIASTOLIC BLOOD PRESSURE: 60 MMHG

## 2022-09-21 DIAGNOSIS — R91.8 OTHER NONSPECIFIC ABNORMAL FINDING OF LUNG FIELD: ICD-10-CM

## 2022-09-21 PROCEDURE — 94729 DIFFUSING CAPACITY: CPT

## 2022-09-21 PROCEDURE — 94060 EVALUATION OF WHEEZING: CPT

## 2022-09-21 PROCEDURE — 99203 OFFICE O/P NEW LOW 30 MIN: CPT | Mod: 25

## 2022-09-21 PROCEDURE — ZZZZZ: CPT

## 2022-09-21 PROCEDURE — 94726 PLETHYSMOGRAPHY LUNG VOLUMES: CPT

## 2022-09-21 NOTE — DISCUSSION/SUMMARY
[FreeTextEntry1] : Pulmonary function test is perfect.\par \par 76-year-old woman, non-smoker, thyroid cancer treated in 2021, here for follow-up of lung nodules.  Repeat CT chest without contrast ordered to follow-up on CT from July 2021.

## 2022-09-21 NOTE — HISTORY OF PRESENT ILLNESS
[TextBox_4] : 76-year-old woman here for initial consultation for lung nodules.\par \par Patient is a healthy and active 76-year-old woman.  She has a history of thyroid and parathyroidectomy in  for thyroid cancer, followed by [facility].  Incidentally noted in 2021 to have subcentimeter nodules, 5 and 6 mm.  Follow-up CT chest in 2021 showed no change.  Patient is a lifelong non-smoker.  She is active and exercises regularly.  She has no pulmonary complaints.\par \par Had secondhand exposure growing up.  Her father smoked.  Her mother, who did not smoke,  of lung cancer

## 2022-09-29 ENCOUNTER — APPOINTMENT (OUTPATIENT)
Dept: CT IMAGING | Facility: CLINIC | Age: 77
End: 2022-09-29

## 2022-09-29 ENCOUNTER — OUTPATIENT (OUTPATIENT)
Dept: OUTPATIENT SERVICES | Facility: HOSPITAL | Age: 77
LOS: 1 days | End: 2022-09-29
Payer: MEDICARE

## 2022-09-29 DIAGNOSIS — Z90.49 ACQUIRED ABSENCE OF OTHER SPECIFIED PARTS OF DIGESTIVE TRACT: Chronic | ICD-10-CM

## 2022-09-29 DIAGNOSIS — S62.102D FRACTURE OF UNSPECIFIED CARPAL BONE, LEFT WRIST, SUBSEQUENT ENCOUNTER FOR FRACTURE WITH ROUTINE HEALING: Chronic | ICD-10-CM

## 2022-09-29 DIAGNOSIS — Z98.890 OTHER SPECIFIED POSTPROCEDURAL STATES: Chronic | ICD-10-CM

## 2022-09-29 DIAGNOSIS — R91.8 OTHER NONSPECIFIC ABNORMAL FINDING OF LUNG FIELD: ICD-10-CM

## 2022-09-29 DIAGNOSIS — Z90.89 ACQUIRED ABSENCE OF OTHER ORGANS: Chronic | ICD-10-CM

## 2022-09-29 DIAGNOSIS — M23.8X1 OTHER INTERNAL DERANGEMENTS OF RIGHT KNEE: Chronic | ICD-10-CM

## 2022-09-29 PROCEDURE — 71250 CT THORAX DX C-: CPT | Mod: 26,MH

## 2022-09-29 PROCEDURE — 71250 CT THORAX DX C-: CPT

## 2022-10-03 ENCOUNTER — NON-APPOINTMENT (OUTPATIENT)
Age: 77
End: 2022-10-03

## 2023-02-17 NOTE — ASU PATIENT PROFILE, ADULT - PSH
Yes
Closed fracture of left wrist with routine healing, subsequent encounter    Derangement of right knee ligament  ACL tear  History of appendectomy    History of tonsillectomy

## 2023-05-15 ENCOUNTER — OUTPATIENT (OUTPATIENT)
Dept: OUTPATIENT SERVICES | Facility: HOSPITAL | Age: 78
LOS: 1 days | End: 2023-05-15
Payer: MEDICARE

## 2023-05-15 ENCOUNTER — APPOINTMENT (OUTPATIENT)
Dept: RADIOLOGY | Facility: IMAGING CENTER | Age: 78
End: 2023-05-15
Payer: MEDICARE

## 2023-05-15 DIAGNOSIS — Z90.49 ACQUIRED ABSENCE OF OTHER SPECIFIED PARTS OF DIGESTIVE TRACT: Chronic | ICD-10-CM

## 2023-05-15 DIAGNOSIS — Z98.890 OTHER SPECIFIED POSTPROCEDURAL STATES: Chronic | ICD-10-CM

## 2023-05-15 DIAGNOSIS — M23.8X1 OTHER INTERNAL DERANGEMENTS OF RIGHT KNEE: Chronic | ICD-10-CM

## 2023-05-15 DIAGNOSIS — S62.102D FRACTURE OF UNSPECIFIED CARPAL BONE, LEFT WRIST, SUBSEQUENT ENCOUNTER FOR FRACTURE WITH ROUTINE HEALING: Chronic | ICD-10-CM

## 2023-05-15 DIAGNOSIS — Z00.8 ENCOUNTER FOR OTHER GENERAL EXAMINATION: ICD-10-CM

## 2023-05-15 PROCEDURE — 77085 DXA BONE DENSITY AXL VRT FX: CPT

## 2023-05-15 PROCEDURE — 77085 DXA BONE DENSITY AXL VRT FX: CPT | Mod: 26

## 2023-05-16 ENCOUNTER — APPOINTMENT (OUTPATIENT)
Dept: ULTRASOUND IMAGING | Facility: CLINIC | Age: 78
End: 2023-05-16
Payer: MEDICARE

## 2023-05-16 ENCOUNTER — APPOINTMENT (OUTPATIENT)
Dept: MAMMOGRAPHY | Facility: CLINIC | Age: 78
End: 2023-05-16
Payer: MEDICARE

## 2023-05-16 ENCOUNTER — OUTPATIENT (OUTPATIENT)
Dept: OUTPATIENT SERVICES | Facility: HOSPITAL | Age: 78
LOS: 1 days | End: 2023-05-16
Payer: MEDICARE

## 2023-05-16 DIAGNOSIS — Z90.89 ACQUIRED ABSENCE OF OTHER ORGANS: Chronic | ICD-10-CM

## 2023-05-16 DIAGNOSIS — M23.8X1 OTHER INTERNAL DERANGEMENTS OF RIGHT KNEE: Chronic | ICD-10-CM

## 2023-05-16 DIAGNOSIS — Z98.890 OTHER SPECIFIED POSTPROCEDURAL STATES: Chronic | ICD-10-CM

## 2023-05-16 DIAGNOSIS — Z90.49 ACQUIRED ABSENCE OF OTHER SPECIFIED PARTS OF DIGESTIVE TRACT: Chronic | ICD-10-CM

## 2023-05-16 DIAGNOSIS — Z12.31 ENCOUNTER FOR SCREENING MAMMOGRAM FOR MALIGNANT NEOPLASM OF BREAST: ICD-10-CM

## 2023-05-16 DIAGNOSIS — Z00.8 ENCOUNTER FOR OTHER GENERAL EXAMINATION: ICD-10-CM

## 2023-05-16 DIAGNOSIS — R14.0 ABDOMINAL DISTENSION (GASEOUS): ICD-10-CM

## 2023-05-16 DIAGNOSIS — S62.102D FRACTURE OF UNSPECIFIED CARPAL BONE, LEFT WRIST, SUBSEQUENT ENCOUNTER FOR FRACTURE WITH ROUTINE HEALING: Chronic | ICD-10-CM

## 2023-05-16 PROCEDURE — 77063 BREAST TOMOSYNTHESIS BI: CPT

## 2023-05-16 PROCEDURE — 76830 TRANSVAGINAL US NON-OB: CPT | Mod: 26

## 2023-05-16 PROCEDURE — 76856 US EXAM PELVIC COMPLETE: CPT | Mod: 26

## 2023-05-16 PROCEDURE — 76856 US EXAM PELVIC COMPLETE: CPT

## 2023-05-16 PROCEDURE — 77063 BREAST TOMOSYNTHESIS BI: CPT | Mod: 26

## 2023-05-16 PROCEDURE — 77067 SCR MAMMO BI INCL CAD: CPT | Mod: 26

## 2023-05-16 PROCEDURE — 77067 SCR MAMMO BI INCL CAD: CPT

## 2023-05-16 PROCEDURE — 76641 ULTRASOUND BREAST COMPLETE: CPT | Mod: 26,50,GA

## 2023-05-16 PROCEDURE — 76830 TRANSVAGINAL US NON-OB: CPT

## 2023-05-16 PROCEDURE — 76641 ULTRASOUND BREAST COMPLETE: CPT

## 2023-05-23 ENCOUNTER — APPOINTMENT (OUTPATIENT)
Dept: MAMMOGRAPHY | Facility: CLINIC | Age: 78
End: 2023-05-23

## 2023-05-23 ENCOUNTER — APPOINTMENT (OUTPATIENT)
Dept: ULTRASOUND IMAGING | Facility: CLINIC | Age: 78
End: 2023-05-23

## 2023-10-04 ENCOUNTER — APPOINTMENT (OUTPATIENT)
Dept: CT IMAGING | Facility: CLINIC | Age: 78
End: 2023-10-04
Payer: MEDICARE

## 2023-10-04 ENCOUNTER — OUTPATIENT (OUTPATIENT)
Dept: OUTPATIENT SERVICES | Facility: HOSPITAL | Age: 78
LOS: 1 days | End: 2023-10-04
Payer: MEDICARE

## 2023-10-04 DIAGNOSIS — Z98.890 OTHER SPECIFIED POSTPROCEDURAL STATES: Chronic | ICD-10-CM

## 2023-10-04 DIAGNOSIS — M23.8X1 OTHER INTERNAL DERANGEMENTS OF RIGHT KNEE: Chronic | ICD-10-CM

## 2023-10-04 DIAGNOSIS — Z90.49 ACQUIRED ABSENCE OF OTHER SPECIFIED PARTS OF DIGESTIVE TRACT: Chronic | ICD-10-CM

## 2023-10-04 DIAGNOSIS — S62.102D FRACTURE OF UNSPECIFIED CARPAL BONE, LEFT WRIST, SUBSEQUENT ENCOUNTER FOR FRACTURE WITH ROUTINE HEALING: Chronic | ICD-10-CM

## 2023-10-04 DIAGNOSIS — Z90.89 ACQUIRED ABSENCE OF OTHER ORGANS: Chronic | ICD-10-CM

## 2023-10-04 DIAGNOSIS — R91.1 SOLITARY PULMONARY NODULE: ICD-10-CM

## 2023-10-04 PROCEDURE — 71250 CT THORAX DX C-: CPT

## 2023-10-04 PROCEDURE — 71250 CT THORAX DX C-: CPT | Mod: 26,MH

## 2023-10-10 ENCOUNTER — NON-APPOINTMENT (OUTPATIENT)
Age: 78
End: 2023-10-10

## 2023-10-16 ENCOUNTER — NON-APPOINTMENT (OUTPATIENT)
Age: 78
End: 2023-10-16

## 2023-11-15 ENCOUNTER — APPOINTMENT (OUTPATIENT)
Dept: UROGYNECOLOGY | Facility: CLINIC | Age: 78
End: 2023-11-15
Payer: MEDICARE

## 2023-11-15 VITALS
DIASTOLIC BLOOD PRESSURE: 83 MMHG | SYSTOLIC BLOOD PRESSURE: 125 MMHG | HEIGHT: 60 IN | WEIGHT: 106 LBS | HEART RATE: 76 BPM | BODY MASS INDEX: 20.81 KG/M2

## 2023-11-15 DIAGNOSIS — N81.4 UTEROVAGINAL PROLAPSE, UNSPECIFIED: ICD-10-CM

## 2023-11-15 DIAGNOSIS — N95.2 POSTMENOPAUSAL ATROPHIC VAGINITIS: ICD-10-CM

## 2023-11-15 PROCEDURE — 99213 OFFICE O/P EST LOW 20 MIN: CPT

## 2023-11-15 PROCEDURE — A4562: CPT

## 2023-11-15 RX ORDER — ESTRADIOL 0.1 MG/G
0.1 CREAM VAGINAL
Qty: 1 | Refills: 3 | Status: ACTIVE | COMMUNITY
Start: 2023-11-15 | End: 1900-01-01

## 2023-12-04 NOTE — ASU PREOP CHECKLIST - BP NONINVASIVE SYSTOLIC (MM HG)
Procedure time of 11:30 matches the surgical board time of 1130 at this time.       Immediately report any new sign of illness to our surgeon's office  Masking is optional at our facility (masks will be provided at entrances for those who wish to use one). If you would like teammates to wear a mask, please request this of your care team the day of surgery.    
131

## 2024-05-20 ENCOUNTER — OUTPATIENT (OUTPATIENT)
Dept: OUTPATIENT SERVICES | Facility: HOSPITAL | Age: 79
LOS: 1 days | End: 2024-05-20
Payer: MEDICARE

## 2024-05-20 ENCOUNTER — APPOINTMENT (OUTPATIENT)
Dept: RADIOLOGY | Facility: HOSPITAL | Age: 79
End: 2024-05-20
Payer: MEDICARE

## 2024-05-20 DIAGNOSIS — Z90.49 ACQUIRED ABSENCE OF OTHER SPECIFIED PARTS OF DIGESTIVE TRACT: Chronic | ICD-10-CM

## 2024-05-20 DIAGNOSIS — Z98.890 OTHER SPECIFIED POSTPROCEDURAL STATES: Chronic | ICD-10-CM

## 2024-05-20 DIAGNOSIS — S62.102D FRACTURE OF UNSPECIFIED CARPAL BONE, LEFT WRIST, SUBSEQUENT ENCOUNTER FOR FRACTURE WITH ROUTINE HEALING: Chronic | ICD-10-CM

## 2024-05-20 DIAGNOSIS — Z00.00 ENCOUNTER FOR GENERAL ADULT MEDICAL EXAMINATION WITHOUT ABNORMAL FINDINGS: ICD-10-CM

## 2024-05-20 DIAGNOSIS — M23.8X1 OTHER INTERNAL DERANGEMENTS OF RIGHT KNEE: Chronic | ICD-10-CM

## 2024-05-20 DIAGNOSIS — Z90.89 ACQUIRED ABSENCE OF OTHER ORGANS: Chronic | ICD-10-CM

## 2024-05-20 PROCEDURE — 74221 X-RAY XM ESOPHAGUS 2CNTRST: CPT

## 2024-05-20 PROCEDURE — 74221 X-RAY XM ESOPHAGUS 2CNTRST: CPT | Mod: 26

## 2024-05-21 DIAGNOSIS — R13.10 DYSPHAGIA, UNSPECIFIED: ICD-10-CM

## 2024-05-22 ENCOUNTER — APPOINTMENT (OUTPATIENT)
Dept: MAMMOGRAPHY | Facility: CLINIC | Age: 79
End: 2024-05-22
Payer: MEDICARE

## 2024-05-22 ENCOUNTER — APPOINTMENT (OUTPATIENT)
Dept: ULTRASOUND IMAGING | Facility: CLINIC | Age: 79
End: 2024-05-22
Payer: MEDICARE

## 2024-05-22 ENCOUNTER — OUTPATIENT (OUTPATIENT)
Dept: OUTPATIENT SERVICES | Facility: HOSPITAL | Age: 79
LOS: 1 days | End: 2024-05-22
Payer: MEDICARE

## 2024-05-22 DIAGNOSIS — Z90.89 ACQUIRED ABSENCE OF OTHER ORGANS: Chronic | ICD-10-CM

## 2024-05-22 DIAGNOSIS — Z98.890 OTHER SPECIFIED POSTPROCEDURAL STATES: Chronic | ICD-10-CM

## 2024-05-22 DIAGNOSIS — Z12.31 ENCOUNTER FOR SCREENING MAMMOGRAM FOR MALIGNANT NEOPLASM OF BREAST: ICD-10-CM

## 2024-05-22 DIAGNOSIS — M23.8X1 OTHER INTERNAL DERANGEMENTS OF RIGHT KNEE: Chronic | ICD-10-CM

## 2024-05-22 DIAGNOSIS — Z90.49 ACQUIRED ABSENCE OF OTHER SPECIFIED PARTS OF DIGESTIVE TRACT: Chronic | ICD-10-CM

## 2024-05-22 DIAGNOSIS — S62.102D FRACTURE OF UNSPECIFIED CARPAL BONE, LEFT WRIST, SUBSEQUENT ENCOUNTER FOR FRACTURE WITH ROUTINE HEALING: Chronic | ICD-10-CM

## 2024-05-22 PROCEDURE — 77067 SCR MAMMO BI INCL CAD: CPT | Mod: 26

## 2024-05-22 PROCEDURE — 77067 SCR MAMMO BI INCL CAD: CPT

## 2024-05-22 PROCEDURE — 77063 BREAST TOMOSYNTHESIS BI: CPT | Mod: 26

## 2024-05-22 PROCEDURE — 76641 ULTRASOUND BREAST COMPLETE: CPT | Mod: 26,50,GY

## 2024-05-22 PROCEDURE — 76641 ULTRASOUND BREAST COMPLETE: CPT

## 2024-05-22 PROCEDURE — 77063 BREAST TOMOSYNTHESIS BI: CPT

## 2024-08-05 ENCOUNTER — APPOINTMENT (OUTPATIENT)
Dept: UROGYNECOLOGY | Facility: CLINIC | Age: 79
End: 2024-08-05

## 2024-08-14 ENCOUNTER — APPOINTMENT (OUTPATIENT)
Dept: ULTRASOUND IMAGING | Facility: CLINIC | Age: 79
End: 2024-08-14
Payer: MEDICARE

## 2024-08-14 PROCEDURE — 76856 US EXAM PELVIC COMPLETE: CPT

## 2024-08-14 PROCEDURE — 76830 TRANSVAGINAL US NON-OB: CPT

## 2025-02-26 ENCOUNTER — NON-APPOINTMENT (OUTPATIENT)
Age: 80
End: 2025-02-26

## 2025-02-27 ENCOUNTER — APPOINTMENT (OUTPATIENT)
Dept: PULMONOLOGY | Facility: CLINIC | Age: 80
End: 2025-02-27
Payer: MEDICARE

## 2025-02-27 VITALS
SYSTOLIC BLOOD PRESSURE: 119 MMHG | TEMPERATURE: 97.6 F | DIASTOLIC BLOOD PRESSURE: 72 MMHG | BODY MASS INDEX: 21.2 KG/M2 | HEIGHT: 60 IN | OXYGEN SATURATION: 95 % | WEIGHT: 108 LBS | HEART RATE: 74 BPM

## 2025-02-27 DIAGNOSIS — R91.1 SOLITARY PULMONARY NODULE: ICD-10-CM

## 2025-02-27 PROCEDURE — 99213 OFFICE O/P EST LOW 20 MIN: CPT

## 2025-05-05 ENCOUNTER — NON-APPOINTMENT (OUTPATIENT)
Age: 80
End: 2025-05-05

## 2025-05-05 ENCOUNTER — APPOINTMENT (OUTPATIENT)
Dept: OPHTHALMOLOGY | Facility: CLINIC | Age: 80
End: 2025-05-05
Payer: MEDICARE

## 2025-05-05 PROCEDURE — 92004 COMPRE OPH EXAM NEW PT 1/>: CPT

## 2025-05-06 ENCOUNTER — OUTPATIENT (OUTPATIENT)
Dept: OUTPATIENT SERVICES | Facility: HOSPITAL | Age: 80
LOS: 1 days | End: 2025-05-06
Payer: MEDICARE

## 2025-05-06 ENCOUNTER — APPOINTMENT (OUTPATIENT)
Dept: CT IMAGING | Facility: CLINIC | Age: 80
End: 2025-05-06
Payer: MEDICARE

## 2025-05-06 DIAGNOSIS — Z90.89 ACQUIRED ABSENCE OF OTHER ORGANS: Chronic | ICD-10-CM

## 2025-05-06 DIAGNOSIS — M23.8X1 OTHER INTERNAL DERANGEMENTS OF RIGHT KNEE: Chronic | ICD-10-CM

## 2025-05-06 DIAGNOSIS — Z90.49 ACQUIRED ABSENCE OF OTHER SPECIFIED PARTS OF DIGESTIVE TRACT: Chronic | ICD-10-CM

## 2025-05-06 DIAGNOSIS — S62.102D FRACTURE OF UNSPECIFIED CARPAL BONE, LEFT WRIST, SUBSEQUENT ENCOUNTER FOR FRACTURE WITH ROUTINE HEALING: Chronic | ICD-10-CM

## 2025-05-06 DIAGNOSIS — Z00.00 ENCOUNTER FOR GENERAL ADULT MEDICAL EXAMINATION WITHOUT ABNORMAL FINDINGS: ICD-10-CM

## 2025-05-06 DIAGNOSIS — Z98.890 OTHER SPECIFIED POSTPROCEDURAL STATES: Chronic | ICD-10-CM

## 2025-05-06 PROCEDURE — 71250 CT THORAX DX C-: CPT | Mod: 26

## 2025-05-06 PROCEDURE — 71250 CT THORAX DX C-: CPT | Mod: MH

## 2025-05-07 ENCOUNTER — TRANSCRIPTION ENCOUNTER (OUTPATIENT)
Age: 80
End: 2025-05-07

## 2025-06-05 ENCOUNTER — APPOINTMENT (OUTPATIENT)
Dept: ULTRASOUND IMAGING | Facility: CLINIC | Age: 80
End: 2025-06-05

## 2025-06-05 ENCOUNTER — APPOINTMENT (OUTPATIENT)
Dept: MAMMOGRAPHY | Facility: CLINIC | Age: 80
End: 2025-06-05

## 2025-06-05 ENCOUNTER — OUTPATIENT (OUTPATIENT)
Dept: OUTPATIENT SERVICES | Facility: HOSPITAL | Age: 80
LOS: 1 days | End: 2025-06-05
Payer: MEDICARE

## 2025-06-05 DIAGNOSIS — Z90.49 ACQUIRED ABSENCE OF OTHER SPECIFIED PARTS OF DIGESTIVE TRACT: Chronic | ICD-10-CM

## 2025-06-05 DIAGNOSIS — Z12.31 ENCOUNTER FOR SCREENING MAMMOGRAM FOR MALIGNANT NEOPLASM OF BREAST: ICD-10-CM

## 2025-06-05 DIAGNOSIS — Z98.890 OTHER SPECIFIED POSTPROCEDURAL STATES: Chronic | ICD-10-CM

## 2025-06-05 DIAGNOSIS — Z90.89 ACQUIRED ABSENCE OF OTHER ORGANS: Chronic | ICD-10-CM

## 2025-06-05 DIAGNOSIS — M23.8X1 OTHER INTERNAL DERANGEMENTS OF RIGHT KNEE: Chronic | ICD-10-CM

## 2025-06-05 DIAGNOSIS — S62.102D FRACTURE OF UNSPECIFIED CARPAL BONE, LEFT WRIST, SUBSEQUENT ENCOUNTER FOR FRACTURE WITH ROUTINE HEALING: Chronic | ICD-10-CM

## 2025-06-05 PROCEDURE — 76641 ULTRASOUND BREAST COMPLETE: CPT | Mod: 26,50,GA

## 2025-06-05 PROCEDURE — 77067 SCR MAMMO BI INCL CAD: CPT

## 2025-06-05 PROCEDURE — 77063 BREAST TOMOSYNTHESIS BI: CPT

## 2025-06-05 PROCEDURE — 76641 ULTRASOUND BREAST COMPLETE: CPT

## 2025-06-05 PROCEDURE — 77063 BREAST TOMOSYNTHESIS BI: CPT | Mod: 26

## 2025-06-05 PROCEDURE — 77067 SCR MAMMO BI INCL CAD: CPT | Mod: 26

## 2025-06-11 ENCOUNTER — OUTPATIENT (OUTPATIENT)
Dept: OUTPATIENT SERVICES | Facility: HOSPITAL | Age: 80
LOS: 1 days | End: 2025-06-11
Payer: MEDICARE

## 2025-06-11 ENCOUNTER — APPOINTMENT (OUTPATIENT)
Dept: RADIOLOGY | Facility: CLINIC | Age: 80
End: 2025-06-11
Payer: MEDICARE

## 2025-06-11 DIAGNOSIS — Z90.49 ACQUIRED ABSENCE OF OTHER SPECIFIED PARTS OF DIGESTIVE TRACT: Chronic | ICD-10-CM

## 2025-06-11 DIAGNOSIS — Z98.890 OTHER SPECIFIED POSTPROCEDURAL STATES: Chronic | ICD-10-CM

## 2025-06-11 DIAGNOSIS — Z00.00 ENCOUNTER FOR GENERAL ADULT MEDICAL EXAMINATION WITHOUT ABNORMAL FINDINGS: ICD-10-CM

## 2025-06-11 DIAGNOSIS — S62.102D FRACTURE OF UNSPECIFIED CARPAL BONE, LEFT WRIST, SUBSEQUENT ENCOUNTER FOR FRACTURE WITH ROUTINE HEALING: Chronic | ICD-10-CM

## 2025-06-11 DIAGNOSIS — Z90.89 ACQUIRED ABSENCE OF OTHER ORGANS: Chronic | ICD-10-CM

## 2025-06-11 DIAGNOSIS — M23.8X1 OTHER INTERNAL DERANGEMENTS OF RIGHT KNEE: Chronic | ICD-10-CM

## 2025-06-11 PROCEDURE — 73564 X-RAY EXAM KNEE 4 OR MORE: CPT | Mod: 26,50

## 2025-06-11 PROCEDURE — 73564 X-RAY EXAM KNEE 4 OR MORE: CPT

## 2025-07-08 ENCOUNTER — OUTPATIENT (OUTPATIENT)
Dept: OUTPATIENT SERVICES | Facility: HOSPITAL | Age: 80
LOS: 1 days | End: 2025-07-08
Payer: MEDICARE

## 2025-07-08 ENCOUNTER — APPOINTMENT (OUTPATIENT)
Dept: RADIOLOGY | Facility: IMAGING CENTER | Age: 80
End: 2025-07-08
Payer: MEDICARE

## 2025-07-08 DIAGNOSIS — S62.102D FRACTURE OF UNSPECIFIED CARPAL BONE, LEFT WRIST, SUBSEQUENT ENCOUNTER FOR FRACTURE WITH ROUTINE HEALING: Chronic | ICD-10-CM

## 2025-07-08 DIAGNOSIS — Z98.890 OTHER SPECIFIED POSTPROCEDURAL STATES: Chronic | ICD-10-CM

## 2025-07-08 DIAGNOSIS — Z90.49 ACQUIRED ABSENCE OF OTHER SPECIFIED PARTS OF DIGESTIVE TRACT: Chronic | ICD-10-CM

## 2025-07-08 DIAGNOSIS — M23.8X1 OTHER INTERNAL DERANGEMENTS OF RIGHT KNEE: Chronic | ICD-10-CM

## 2025-07-08 DIAGNOSIS — Z00.8 ENCOUNTER FOR OTHER GENERAL EXAMINATION: ICD-10-CM

## 2025-07-08 PROCEDURE — 77085 DXA BONE DENSITY AXL VRT FX: CPT

## 2025-07-08 PROCEDURE — 77085 DXA BONE DENSITY AXL VRT FX: CPT | Mod: 26

## 2025-07-10 NOTE — PROGRESS NOTE ADULT - NSICDXPILOT_GEN_ALL_CORE
[place]
PROVIDER:[TOKEN:[[id number]]],FREE:[LAST:[PCP],PHONE:[(   )    -],FAX:[(   )    -]]